# Patient Record
Sex: FEMALE | Race: OTHER | Employment: UNEMPLOYED | URBAN - METROPOLITAN AREA
[De-identification: names, ages, dates, MRNs, and addresses within clinical notes are randomized per-mention and may not be internally consistent; named-entity substitution may affect disease eponyms.]

---

## 2024-02-26 ENCOUNTER — OFFICE VISIT (OUTPATIENT)
Age: 29
End: 2024-02-26

## 2024-02-26 VITALS
SYSTOLIC BLOOD PRESSURE: 125 MMHG | DIASTOLIC BLOOD PRESSURE: 72 MMHG | TEMPERATURE: 97.6 F | RESPIRATION RATE: 18 BRPM | OXYGEN SATURATION: 99 % | HEART RATE: 58 BPM | HEIGHT: 60 IN | WEIGHT: 138 LBS | BODY MASS INDEX: 27.09 KG/M2

## 2024-02-26 DIAGNOSIS — N92.6 MISSED MENSES: Primary | ICD-10-CM

## 2024-02-26 DIAGNOSIS — O21.9 NAUSEA/VOMITING IN PREGNANCY: ICD-10-CM

## 2024-02-26 LAB
HCG, PREGNANCY, URINE, POC: POSITIVE
VALID INTERNAL CONTROL, POC: NORMAL

## 2024-02-26 PROCEDURE — PBSHW AMB POC URINE PREGNANCY TEST, VISUAL COLOR COMPARISON

## 2024-02-26 PROCEDURE — 99203 OFFICE O/P NEW LOW 30 MIN: CPT

## 2024-02-26 PROCEDURE — 81025 URINE PREGNANCY TEST: CPT

## 2024-02-26 RX ORDER — PNV NO.95/FERROUS FUM/FOLIC AC 28MG-0.8MG
1 TABLET ORAL DAILY
Qty: 30 TABLET | Refills: 5 | Status: SHIPPED | OUTPATIENT
Start: 2024-02-26

## 2024-02-26 RX ORDER — ONDANSETRON 4 MG/1
4 TABLET, FILM COATED ORAL DAILY PRN
Qty: 30 TABLET | Refills: 0 | Status: SHIPPED | OUTPATIENT
Start: 2024-02-26

## 2024-02-26 SDOH — ECONOMIC STABILITY: FOOD INSECURITY: WITHIN THE PAST 12 MONTHS, THE FOOD YOU BOUGHT JUST DIDN'T LAST AND YOU DIDN'T HAVE MONEY TO GET MORE.: SOMETIMES TRUE

## 2024-02-26 SDOH — ECONOMIC STABILITY: FOOD INSECURITY: WITHIN THE PAST 12 MONTHS, YOU WORRIED THAT YOUR FOOD WOULD RUN OUT BEFORE YOU GOT MONEY TO BUY MORE.: SOMETIMES TRUE

## 2024-02-26 SDOH — ECONOMIC STABILITY: INCOME INSECURITY: HOW HARD IS IT FOR YOU TO PAY FOR THE VERY BASICS LIKE FOOD, HOUSING, MEDICAL CARE, AND HEATING?: SOMEWHAT HARD

## 2024-02-26 SDOH — ECONOMIC STABILITY: HOUSING INSECURITY
IN THE LAST 12 MONTHS, WAS THERE A TIME WHEN YOU DID NOT HAVE A STEADY PLACE TO SLEEP OR SLEPT IN A SHELTER (INCLUDING NOW)?: NO

## 2024-02-26 ASSESSMENT — PATIENT HEALTH QUESTIONNAIRE - PHQ9
2. FEELING DOWN, DEPRESSED OR HOPELESS: 0
SUM OF ALL RESPONSES TO PHQ QUESTIONS 1-9: 0
SUM OF ALL RESPONSES TO PHQ9 QUESTIONS 1 & 2: 0
SUM OF ALL RESPONSES TO PHQ QUESTIONS 1-9: 0
1. LITTLE INTEREST OR PLEASURE IN DOING THINGS: 0
SUM OF ALL RESPONSES TO PHQ QUESTIONS 1-9: 0
SUM OF ALL RESPONSES TO PHQ QUESTIONS 1-9: 0

## 2024-02-26 ASSESSMENT — ENCOUNTER SYMPTOMS: NAUSEA: 1

## 2024-02-26 NOTE — PROGRESS NOTES
Subjective   Elvira Neil is a 28 y.o. female who presents for New Patient (Pt reports to [facility] care having Nausea for last 2 weeks & back pain. Pt reports for missed menses & UPT was positive. LMP 01/05/2024, 3rd pregnancy, 2 children .)      HPI  - Patient is here for evaluation of missed menses.   - Her menstrual periods are regular, and she reports that the first date of her LMP was 01/05/2024.   - She was not planning for this pregnancy.   - She is in a relationship with the father of the baby.   - She has had two other term pregnancies, denies any other medical complications with those pregnancies. The first baby was born at 40 weeks and the second at 38 weeks. Both were vaginal deliveries. Denies GHTN or gestational diabetes with either of those pregnancies. Both of the babies are healthy.     - Denies alcohol use. Does not smoke. Does not use recreational drugs.   - She denies history of STD or herpes.     Review of Systems   Review of Systems   Gastrointestinal:  Positive for nausea.     Medical History  No past medical history on file.    Medications  Current Outpatient Medications   Medication Sig    Prenatal Vit-Fe Fumarate-FA (PRENATAL VITAMINS) 28-0.8 MG TABS Take 1 tablet by mouth daily    ondansetron (ZOFRAN) 4 MG tablet Take 1 tablet by mouth daily as needed for Nausea or Vomiting     No current facility-administered medications for this visit.     Immunizations     There is no immunization history on file for this patient.    Allergies   No Known Allergies    Objective   Vital Signs  /72 (Site: Left Upper Arm, Position: Sitting, Cuff Size: Medium Adult)   Pulse 58   Temp 97.6 °F (36.4 °C) (Oral)   Resp 18   Ht 1.515 m (4' 11.65\")   Wt 62.6 kg (138 lb)   SpO2 99%   BMI 27.27 kg/m²     Physical Exam  Constitutional:       Appearance: Normal appearance.   Cardiovascular:      Rate and Rhythm: Normal rate and regular rhythm.      Pulses: Normal pulses.      Heart sounds: Normal heart

## 2024-02-26 NOTE — PROGRESS NOTES
# [id number]    Patient has been identified by name and .    Chief Complaint   Patient presents with    New Patient     Pt reports to [facility] care having Nausea for last 2 weeks & back pain. Pt reports for missed menses & UPT was positive. LMP 2024, 3rd pregnancy, 2 children .       Vitals:    24 1436   BP: 125/72   Site: Left Upper Arm   Position: Sitting   Cuff Size: Medium Adult   Pulse: 58   Resp: 18   Temp: 97.6 °F (36.4 °C)   TempSrc: Oral   SpO2: 99%   Weight: 62.6 kg (138 lb)   Height: 1.515 m (4' 11.65\")        \"Have you been to the ER, urgent care clinic since your last visit?  Hospitalized since your last visit?\"    NO    “Have you seen or consulted any other health care providers outside of [facility] since your last visit?”    NO

## 2024-02-29 ENCOUNTER — HOSPITAL ENCOUNTER (EMERGENCY)
Facility: HOSPITAL | Age: 29
Discharge: HOME | End: 2024-02-29
Attending: EMERGENCY MEDICINE

## 2024-02-29 ENCOUNTER — APPOINTMENT (OUTPATIENT)
Facility: HOSPITAL | Age: 29
End: 2024-02-29

## 2024-02-29 VITALS
HEIGHT: 65 IN | RESPIRATION RATE: 17 BRPM | WEIGHT: 138 LBS | HEART RATE: 71 BPM | OXYGEN SATURATION: 100 % | BODY MASS INDEX: 22.99 KG/M2 | SYSTOLIC BLOOD PRESSURE: 130 MMHG | TEMPERATURE: 98.4 F | DIASTOLIC BLOOD PRESSURE: 83 MMHG

## 2024-02-29 DIAGNOSIS — K21.9 GASTROESOPHAGEAL REFLUX DISEASE WITHOUT ESOPHAGITIS: ICD-10-CM

## 2024-02-29 DIAGNOSIS — O26.891 ABDOMINAL PAIN DURING PREGNANCY IN FIRST TRIMESTER: Primary | ICD-10-CM

## 2024-02-29 DIAGNOSIS — R10.9 ABDOMINAL PAIN DURING PREGNANCY IN FIRST TRIMESTER: Primary | ICD-10-CM

## 2024-02-29 LAB
ALBUMIN SERPL-MCNC: 3.8 G/DL (ref 3.5–5)
ALBUMIN/GLOB SERPL: 0.9 (ref 1.1–2.2)
ALP SERPL-CCNC: 72 U/L (ref 45–117)
ALT SERPL-CCNC: 28 U/L (ref 12–78)
ANION GAP SERPL CALC-SCNC: 4 MMOL/L (ref 5–15)
APPEARANCE UR: CLEAR
AST SERPL-CCNC: 15 U/L (ref 15–37)
BACTERIA URNS QL MICRO: NEGATIVE /HPF
BASOPHILS # BLD: 0.1 K/UL (ref 0–0.1)
BASOPHILS NFR BLD: 1 % (ref 0–1)
BILIRUB SERPL-MCNC: 0.2 MG/DL (ref 0.2–1)
BILIRUB UR QL: NEGATIVE
BUN SERPL-MCNC: 5 MG/DL (ref 6–20)
BUN/CREAT SERPL: 7 (ref 12–20)
CALCIUM SERPL-MCNC: 9.5 MG/DL (ref 8.5–10.1)
CHLORIDE SERPL-SCNC: 107 MMOL/L (ref 97–108)
CO2 SERPL-SCNC: 27 MMOL/L (ref 21–32)
COLOR UR: ABNORMAL
COMMENT:: NORMAL
CREAT SERPL-MCNC: 0.67 MG/DL (ref 0.55–1.02)
DIFFERENTIAL METHOD BLD: ABNORMAL
EOSINOPHIL # BLD: 0.1 K/UL (ref 0–0.4)
EOSINOPHIL NFR BLD: 1 % (ref 0–7)
EPITH CASTS URNS QL MICRO: ABNORMAL /LPF
ERYTHROCYTE [DISTWIDTH] IN BLOOD BY AUTOMATED COUNT: 13 % (ref 11.5–14.5)
GLOBULIN SER CALC-MCNC: 4.2 G/DL (ref 2–4)
GLUCOSE SERPL-MCNC: 93 MG/DL (ref 65–100)
GLUCOSE UR STRIP.AUTO-MCNC: NEGATIVE MG/DL
HCG SERPL-ACNC: ABNORMAL MIU/ML (ref 0–6)
HCT VFR BLD AUTO: 39.1 % (ref 35–47)
HGB BLD-MCNC: 13.3 G/DL (ref 11.5–16)
HGB UR QL STRIP: NEGATIVE
HYALINE CASTS URNS QL MICRO: ABNORMAL /LPF (ref 0–2)
IMM GRANULOCYTES # BLD AUTO: 0 K/UL (ref 0–0.04)
IMM GRANULOCYTES NFR BLD AUTO: 0 % (ref 0–0.5)
KETONES UR QL STRIP.AUTO: NEGATIVE MG/DL
LEUKOCYTE ESTERASE UR QL STRIP.AUTO: NEGATIVE
LYMPHOCYTES # BLD: 3.5 K/UL (ref 0.8–3.5)
LYMPHOCYTES NFR BLD: 27 % (ref 12–49)
MCH RBC QN AUTO: 30.9 PG (ref 26–34)
MCHC RBC AUTO-ENTMCNC: 34 G/DL (ref 30–36.5)
MCV RBC AUTO: 90.7 FL (ref 80–99)
MONOCYTES # BLD: 0.6 K/UL (ref 0–1)
MONOCYTES NFR BLD: 5 % (ref 5–13)
NEUTS SEG # BLD: 8.5 K/UL (ref 1.8–8)
NEUTS SEG NFR BLD: 66 % (ref 32–75)
NITRITE UR QL STRIP.AUTO: NEGATIVE
NRBC # BLD: 0 K/UL (ref 0–0.01)
NRBC BLD-RTO: 0 PER 100 WBC
PH UR STRIP: 7 (ref 5–8)
PLATELET # BLD AUTO: 390 K/UL (ref 150–400)
PMV BLD AUTO: 9.5 FL (ref 8.9–12.9)
POTASSIUM SERPL-SCNC: 3.5 MMOL/L (ref 3.5–5.1)
PROT SERPL-MCNC: 8 G/DL (ref 6.4–8.2)
PROT UR STRIP-MCNC: NEGATIVE MG/DL
RBC # BLD AUTO: 4.31 M/UL (ref 3.8–5.2)
RBC #/AREA URNS HPF: ABNORMAL /HPF (ref 0–5)
SODIUM SERPL-SCNC: 138 MMOL/L (ref 136–145)
SP GR UR REFRACTOMETRY: 1.01 (ref 1–1.03)
SPECIMEN HOLD: NORMAL
URINE CULTURE IF INDICATED: ABNORMAL
UROBILINOGEN UR QL STRIP.AUTO: 1 EU/DL (ref 0.2–1)
WBC # BLD AUTO: 12.9 K/UL (ref 3.6–11)
WBC URNS QL MICRO: ABNORMAL /HPF (ref 0–4)

## 2024-02-29 PROCEDURE — 76817 TRANSVAGINAL US OBSTETRIC: CPT

## 2024-02-29 PROCEDURE — 81001 URINALYSIS AUTO W/SCOPE: CPT

## 2024-02-29 PROCEDURE — 85025 COMPLETE CBC W/AUTO DIFF WBC: CPT

## 2024-02-29 PROCEDURE — 36415 COLL VENOUS BLD VENIPUNCTURE: CPT

## 2024-02-29 PROCEDURE — 84702 CHORIONIC GONADOTROPIN TEST: CPT

## 2024-02-29 PROCEDURE — 80053 COMPREHEN METABOLIC PANEL: CPT

## 2024-02-29 PROCEDURE — 6370000000 HC RX 637 (ALT 250 FOR IP): Performed by: NURSE PRACTITIONER

## 2024-02-29 PROCEDURE — 99284 EMERGENCY DEPT VISIT MOD MDM: CPT

## 2024-02-29 RX ORDER — POLYETHYLENE GLYCOL 3350 17 G/17G
17 POWDER, FOR SOLUTION ORAL DAILY
Status: DISCONTINUED | OUTPATIENT
Start: 2024-03-01 | End: 2024-02-29

## 2024-02-29 RX ORDER — POLYETHYLENE GLYCOL 3350 17 G/17G
17 POWDER, FOR SOLUTION ORAL DAILY
Qty: 510 G | Refills: 1 | Status: SHIPPED | OUTPATIENT
Start: 2024-02-29 | End: 2024-04-29

## 2024-02-29 RX ADMIN — ALUMINUM HYDROXIDE, MAGNESIUM HYDROXIDE, DIMETHICONE 40 ML: 400; 400; 40 SUSPENSION ORAL at 22:14

## 2024-02-29 ASSESSMENT — PAIN SCALES - GENERAL: PAINLEVEL_OUTOF10: 7

## 2024-02-29 ASSESSMENT — PAIN DESCRIPTION - LOCATION: LOCATION: ABDOMEN

## 2024-02-29 ASSESSMENT — PAIN - FUNCTIONAL ASSESSMENT: PAIN_FUNCTIONAL_ASSESSMENT: 0-10

## 2024-03-01 NOTE — ED TRIAGE NOTES
Pt presents to ER w/ c/o epigastric pain and bloating that began about 3 days ago. Has not tried any meds at home.  She states she is pregnant and is about 7 weeks pregnant. She states she goes to the [facility] for her OB care.

## 2024-03-01 NOTE — ED PROVIDER NOTES
Negative   Blood, Urine Negative   Urobilinogen, Urine 1.0   Nitrite, Urine Negative   Leukocyte Esterase, Urine Negative   Urine Culture if Indicated CULTURE NOT INDICATED BY UA RESULT   WBC, UA 0-4   RBC, UA 0-5   Epithelial Cells, UA MANY(!)   Bacteria, UA Negative   Hyaline Casts, UA 0-2  Negative for cystitis. [AF]   2145 Comprehensive Metabolic Panel(!):    Sodium 138   Potassium 3.5   Chloride 107   CO2 27   Anion Gap 4(!)   Glucose, Random 93   BUN,BUNPL 5(!)   Creatinine 0.67   Bun/Cre Ratio 7(!)   Est, Glom Filt Rate >60   CALCIUM, SERUM, 715687 9.5   BILIRUBIN TOTAL 0.2   ALT 28   AST 15   Alk Phos 72   Total Protein 8.0   Albumin 3.8   Globulin 4.2(!)   ALBUMIN/GLOBULIN RATIO 0.9(!)  WNL [AF]   2145 CBC with Auto Differential(!):    WBC 12.9(!)   RBC 4.31   Hemoglobin Quant 13.3   Hematocrit 39.1   MCV 90.7   MCH 30.9   MCHC 34.0   RDW 13.0   Platelet Count 390   MPV 9.5   Nucleated Red Blood Cells 0.0   Nucleated Red Blood Cells 0.00   Neutrophils % 66   Lymphocyte % 27   Monocytes % 5   Eosinophils % 1   Basophils % 1   Immature Granulocytes 0   Neutrophils Absolute 8.5(!)   Lymphocytes Absolute 3.5   Monocytes Absolute 0.6   Eosinophils Absolute 0.1   Basophils Absolute 0.1   Absolute Immature Granulocyte 0.0   Differential Type AUTOMATED  Wbc 12.9 [AF]   2256 HCG, Quantitative, Pregnancy(!):    hCG Quant 127,645(!)  680473 [AF]   2256 US OB TRANSVAGINAL  IMPRESSION:  Single viable intrauterine pregnancy with estimated gestational age  of 7 weeks 5 days. Normal ovaries.      [AF]      ED Course User Index  [AF] She Roger, APRN - NP   Patient re-evaluated with the  [place]: Ant [id number], all labs and imaging reassuring, patient patient states she is feeling better at this time after the GI cocktail.  Patient states that since she has started taking the Zofran that her bowel movements but that she did have a normal bowel movement.  Lab work unremarkable for dehydration, mild

## 2024-03-01 NOTE — DISCHARGE INSTRUCTIONS
TUMS, Mylanta or Miralax or Tylenol.           Thank you for allowing us to provide you with medical care today.  We realize that you have many choices for your emergency care needs.  We thank you for choosing [facility].  Please choose us in the future for any continued health care needs.     We hope we addressed all of your medical concerns. We strive to provide excellent quality care in the Emergency Department.  Anything less than excellent does not meet our expectations.     The exam and treatment you received in the Emergency Department were for an emergent problem and are not intended as complete care. It is important that you follow up with a doctor, nurse practitioner, or physician’s assistant for ongoing care. If your symptoms worsen or you do not improve as expected and you are unable to reach your usual health care provider, you should return to the Emergency Department. We are available 24 hours a day.     Take this sheet with you when you go to your follow-up visit.     If you have any problem arranging the follow-up visit, contact the Emergency Department immediately.     Make an appointment your family doctor for follow up of this visit. Return to the ER if you are unable to be seen in a timely manner.

## 2024-03-18 ENCOUNTER — HOSPITAL ENCOUNTER (OUTPATIENT)
Facility: HOSPITAL | Age: 29
Setting detail: SPECIMEN
Discharge: HOME | End: 2024-03-21
Payer: MEDICAID

## 2024-03-18 ENCOUNTER — OFFICE VISIT (OUTPATIENT)
Age: 29
End: 2024-03-18

## 2024-03-18 ENCOUNTER — ROUTINE PRENATAL (OUTPATIENT)
Age: 29
End: 2024-03-18

## 2024-03-18 VITALS
SYSTOLIC BLOOD PRESSURE: 112 MMHG | HEIGHT: 65 IN | DIASTOLIC BLOOD PRESSURE: 70 MMHG | BODY MASS INDEX: 22.66 KG/M2 | WEIGHT: 136 LBS | RESPIRATION RATE: 13 BRPM | HEART RATE: 84 BPM | TEMPERATURE: 98.8 F | OXYGEN SATURATION: 98 %

## 2024-03-18 DIAGNOSIS — Z59.9 FINANCIAL DIFFICULTY: ICD-10-CM

## 2024-03-18 DIAGNOSIS — Z13.9 ENCOUNTER FOR SCREENING INVOLVING SOCIAL DETERMINANTS OF HEALTH (SDOH): Primary | ICD-10-CM

## 2024-03-18 DIAGNOSIS — Z34.91 INITIAL OBSTETRIC VISIT IN FIRST TRIMESTER: Primary | ICD-10-CM

## 2024-03-18 DIAGNOSIS — Z59.41 FOOD INSECURITY: ICD-10-CM

## 2024-03-18 LAB
ABO, EXTERNAL RESULT: NORMAL
HEP B, EXTERNAL RESULT: NEGATIVE
HEPATITIS C ANTIBODY, EXTERNAL RESULT: NORMAL
HIV, EXTERNAL RESULT: NORMAL
RH FACTOR, EXTERNAL RESULT: POSITIVE
RUBELLA TITER, EXTERNAL RESULT: NORMAL

## 2024-03-18 PROCEDURE — 88175 CYTOPATH C/V AUTO FLUID REDO: CPT

## 2024-03-18 PROCEDURE — 87491 CHLMYD TRACH DNA AMP PROBE: CPT

## 2024-03-18 PROCEDURE — 87591 N.GONORRHOEAE DNA AMP PROB: CPT

## 2024-03-18 PROCEDURE — 87661 TRICHOMONAS VAGINALIS AMPLIF: CPT

## 2024-03-18 SDOH — ECONOMIC STABILITY - FOOD INSECURITY: FOOD INSECURITY: Z59.41

## 2024-03-18 SDOH — ECONOMIC STABILITY - INCOME SECURITY: PROBLEM RELATED TO HOUSING AND ECONOMIC CIRCUMSTANCES, UNSPECIFIED: Z59.9

## 2024-03-18 NOTE — PROGRESS NOTES
SW Navigator met with patient to complete initial social needs assessment. Patient verified and confirmed demographics on file.      Review of SDOH:   Food insecurity, Financial difficulty, Transportation need    Medical insurance? 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 Has appointment on Wednesday 3/20/24  - Support system , FOB  - Any other worries or concerns, no    Personal Safety:  Domestic violence - no hx DV  General safety - Patient feels safe in relations, in home, and in neighborhood    Patient is 29 yo pregnant female from [facility] [place]. Patient lives with partner and their 2 children ages 10 and 4. Patient is unemployed; however, partner does work. Work hours are limited at times. Reports food insecurity and financial difficulty. Patient asked about transportation services. SW advised patient that transportation is provided via Medicaid. Currently not on a managed care plan and SW unable to add [facility] during visit. Advised to check back in 30 days to select [employer]CO plan, if not already in system. Patient understands she will be able to contact [employer] for transportation needs. Patient has a scheduled WIC appointment, requested pregnancy verification form during visit. SW provided form.      Patient states issue with Medicaid as name is incorrect. LISSET advised that second last name is missing from Medicaid card. LISSET assisted patient by faxing correction request to [facility] DSS office. Patient advised it may take 30+ days for change to be

## 2024-03-18 NOTE — PATIENT INSTRUCTIONS
Recursos de transporte del área de [place]*  (Llame al 211 si necesita más recursos)    [facility] Transit System  Lo que ofrecen: jami transporte público al área [facility] y partes de los condados de [place] y [place].  Sitio web: http://www.[employer]/.  Número de teléfono: [phone]    Servicios especializados [facility] (CARE & CARE Plus).  Lo que ofrecen: proporciona acceso al transporte público para personas con discapacidades que pueden no ser razonablemente capaces de utilizar el servicio de autobús de francine fija de [facility]. Proporciona servicios de origen a destino de acuerdo con las pautas de la [place] para Estadounidenses con Discapacidades (Americans with Disabilities Act, ADA).  Sitio web: http://[employer]/services/specialized-transportation/care.  Número de teléfono: [phone]    [employer] Ride Connection  Lo que ofrecen: Ride Connection ofrece 2 viajes de paul y vuelta por mes a citas médicas que no gary de emergencia (deben calificar).  Sitio web: https://seniorconnections-va.org/services/support-to-stay-home/ride-connections/.  Número de teléfono: [phone]  Requisitos de elegibilidad: personas con discapacidades (de 18 años o más) o adultos mayores (de 60 años o más) que viven en la [facility] o en los condados de [place], [place], [place], [place], [place], [place] y [place].    Let’s Go Services  Lo que ofrecen: servicios de transporte basados en donaciones para veteranos, familias necesitadas, personas de edad avanzada y personas con discapacidades.  Sitio web: https://www.letsgoservices.org/   Número de teléfono: [phone]  Información adicional: ofrece transporte a citas, tiendas de comestibles, aeropuertos, etc. en las áreas de [place], [place], [place], [place] y [place].    [facility] Transit: servicio de transporte comunitario que presta servicios a [place], [facility] and [place], [facility],

## 2024-03-18 NOTE — PROGRESS NOTES
I reviewed with the resident the medical history and the resident's findings on the physical examination.  I discussed with the resident the patient's diagnosis and concur with the plan.    27yo  @ 10w3d by LMP=10 wk dating scan   IUP: IOB labs today, dating completed, will offer NIPT today

## 2024-03-18 NOTE — PROGRESS NOTES
[facility]  [address] [place] [address]   Office [phone], Fax [phone]  Subjective:   Elvira Neil is a 28 y.o.  who is being seen today for her first obstetrical visit.     Patient reports feeling well. No concerns at this time. Pregnancy complicated by prior ectopic pregnancy, prior SAB.    OB History: See Chart    This was not a planned pregnancy, patient is excited about it. Reports good support from FOB.  LMP: 24  GA: 10w3d  Estimated Date of Delivery: 10/11/24    Relevant past medical history:(relevant to this pregnancy):   Denies HTN, DM or asthma. Denies thyroid problems.      Pap smear history:  Last pap smear: 2 years ago, was normal     Substance history:   She does not report current tobacco use.                           She does not report current alcohol use.  She does not report current drug use.     Exposure history:   There are not indoor cat(s) in the home.  The patient was instructed not to change cat litter boxes during pregnancy.  Patient does not report issues with domestic violence.    Allergies- reviewed:   No Known Allergies    Medications- reviewed:   Current Outpatient Medications   Medication Sig    Prenatal Vit-Fe Fumarate-FA (PRENATAL VITAMINS) 28-0.8 MG TABS Take 1 tablet by mouth daily    ondansetron (ZOFRAN) 4 MG tablet Take 1 tablet by mouth daily as needed for Nausea or Vomiting    polyethylene glycol (GLYCOLAX) 17 GM/SCOOP powder Take 17 g by mouth daily (Patient not taking: Reported on 3/18/2024)     No current facility-administered medications for this visit.       Past Medical History- reviewed:  Past Medical History:   Diagnosis Date    Ectopic pregnancy        Past Surgical History- reviewed:   History reviewed. No pertinent surgical history.    Social History- reviewed:  Social History     Socioeconomic History    Marital status: Single     Spouse name: Not on file    Number of children: Not on file

## 2024-03-18 NOTE — PROGRESS NOTES
Session Code 28979  / : # [id number]     No chief complaint on file.      Patient identified with 2 patient identifiers (name and D.O.B)    Patient is a  at Unknown    Leakage of Fluid: NO  Vaginal Bleeding: NO  Prenatal vitamins: YES  Having Contractions: NO  Pain: NO    There were no vitals taken for this visit.      There is no immunization history on file for this patient.    1. Have you been to the ER, urgent care clinic since your last visit?  Hospitalized since your last visit? No    2. Have you seen or consulted any other health care providers outside of the [facility] since your last visit?  Include any pap smears or colon screening. No

## 2024-03-19 LAB
ABO + RH BLD: NORMAL
BLOOD BANK CMNT PATIENT-IMP: NORMAL
BLOOD GROUP ANTIBODIES SERPL: NORMAL
ERYTHROCYTE [DISTWIDTH] IN BLOOD BY AUTOMATED COUNT: 13 % (ref 11.5–14.5)
HBV SURFACE AB SER QL: NONREACTIVE
HBV SURFACE AB SER-ACNC: <3.1 MIU/ML
HBV SURFACE AG SER QL: <0.1 INDEX
HBV SURFACE AG SER QL: NEGATIVE
HCT VFR BLD AUTO: 38.3 % (ref 35–47)
HCV AB SER IA-ACNC: <0.02 INDEX
HCV AB SERPL QL IA: NONREACTIVE
HGB BLD-MCNC: 13 G/DL (ref 11.5–16)
HIV 1+2 AB+HIV1 P24 AG SERPL QL IA: NONREACTIVE
HIV 1/2 RESULT COMMENT: NORMAL
MCH RBC QN AUTO: 30.7 PG (ref 26–34)
MCHC RBC AUTO-ENTMCNC: 33.9 G/DL (ref 30–36.5)
MCV RBC AUTO: 90.3 FL (ref 80–99)
NRBC # BLD: 0 K/UL (ref 0–0.01)
NRBC BLD-RTO: 0 PER 100 WBC
PLATELET # BLD AUTO: 429 K/UL (ref 150–400)
PMV BLD AUTO: 9.2 FL (ref 8.9–12.9)
RBC # BLD AUTO: 4.24 M/UL (ref 3.8–5.2)
RPR SER QL: NONREACTIVE
RUBV IGG SERPL IA-ACNC: NORMAL IU/ML
SPECIMEN EXP DATE BLD: NORMAL
WBC # BLD AUTO: 11.7 K/UL (ref 3.6–11)

## 2024-03-20 LAB
BACTERIA SPEC CULT: NORMAL
C TRACH RRNA SPEC QL NAA+PROBE: NEGATIVE
N GONORRHOEA RRNA SPEC QL NAA+PROBE: NEGATIVE
SERVICE CMNT-IMP: NORMAL
SPECIMEN SOURCE: NORMAL
T VAGINALIS RRNA SPEC QL NAA+PROBE: NEGATIVE
VZV IGG SER IA-ACNC: 190 INDEX

## 2024-03-21 LAB — HBV CORE AB SERPL QL IA: NEGATIVE

## 2024-03-22 LAB
HGB A MFR BLD: 97.7 % (ref 96.4–98.8)
HGB A2 MFR BLD COLUMN CHROM: 2.3 % (ref 1.8–3.2)
HGB F MFR BLD: 0 % (ref 0–2)
HGB FRACT BLD-IMP: NORMAL
HGB S MFR BLD: 0 %

## 2024-04-15 ENCOUNTER — ROUTINE PRENATAL (OUTPATIENT)
Age: 29
End: 2024-04-15
Payer: COMMERCIAL

## 2024-04-15 VITALS
OXYGEN SATURATION: 99 % | HEART RATE: 90 BPM | HEIGHT: 65 IN | TEMPERATURE: 98.5 F | SYSTOLIC BLOOD PRESSURE: 114 MMHG | DIASTOLIC BLOOD PRESSURE: 73 MMHG | WEIGHT: 141 LBS | BODY MASS INDEX: 23.49 KG/M2 | RESPIRATION RATE: 18 BRPM

## 2024-04-15 DIAGNOSIS — Z78.9 NONIMMUNE TO HEPATITIS B VIRUS: ICD-10-CM

## 2024-04-15 DIAGNOSIS — Z3A.14 14 WEEKS GESTATION OF PREGNANCY: Primary | ICD-10-CM

## 2024-04-15 PROCEDURE — 90746 HEPB VACCINE 3 DOSE ADULT IM: CPT | Performed by: STUDENT IN AN ORGANIZED HEALTH CARE EDUCATION/TRAINING PROGRAM

## 2024-04-15 PROCEDURE — 0502F SUBSEQUENT PRENATAL CARE: CPT | Performed by: STUDENT IN AN ORGANIZED HEALTH CARE EDUCATION/TRAINING PROGRAM

## 2024-04-15 ASSESSMENT — PATIENT HEALTH QUESTIONNAIRE - PHQ9
SUM OF ALL RESPONSES TO PHQ QUESTIONS 1-9: 0
SUM OF ALL RESPONSES TO PHQ QUESTIONS 1-9: 0
1. LITTLE INTEREST OR PLEASURE IN DOING THINGS: NOT AT ALL
SUM OF ALL RESPONSES TO PHQ QUESTIONS 1-9: 0
SUM OF ALL RESPONSES TO PHQ QUESTIONS 1-9: 0
2. FEELING DOWN, DEPRESSED OR HOPELESS: NOT AT ALL
SUM OF ALL RESPONSES TO PHQ9 QUESTIONS 1 & 2: 0

## 2024-04-15 NOTE — PROGRESS NOTES
: [id number]    Chief Complaint   Patient presents with    Routine Prenatal Visit        Patient identified by name and . Patient is a  at 14w3d.    Taking prenatal vitamins: Yes  Leakage of fluid: No  Vaginal bleeding: No  Feeling baby move if over 20 weeks: Not yet still 14 weeks  Contractions: No  Pain: No    Vitals:    04/15/24 1504   BP: 114/73   Site: Right Upper Arm   Position: Sitting   Cuff Size: Medium Adult   Pulse: 90   Resp: 18   Temp: 98.5 °F (36.9 °C)   TempSrc: Oral   SpO2: 99%   Weight: 64 kg (141 lb)   Height: 1.64 m (5' 4.57\")          There is no immunization history on file for this patient.    \"Have you been to the ER, urgent care clinic since your last visit?  Hospitalized since your last visit?\"    NO    “Have you seen or consulted any other health care providers outside of [facility] since your last visit?”    NO      Click Here for Release of Records Request            
I reviewed with the resident the medical history and the resident's findings on the physical examination.  I discussed with the resident the patient's diagnosis and concur with the plan.    29yo  @ 14w3d by LMP=10 wk dating scan   IUP: RH pos, dating completed, will offer NIPT today, referral for anatomy sent-confirm scheduled at next visit   Hep B NI: will start series today   
Standard)    [facility] - Minnie Ramírez MD, Maternal Fetal Medicine, [place]      2. Nonimmune to hepatitis B virus  Hep B, ENGERIX-B, (age 20 yrs+), IM, 1mL, 3-dose            Plan   28 y.o.  14w3d LISETTE 10/11/2024, by Last Menstrual Period here for return OB visit     1.SIUP at 14w3d  -PNL:O+, Ab neg. Hgb 13.0. Hgb frac wnl. GC/CT neg. HCV/HIV/HBV/RPR negative. HBV nonimmune. Rubella/VZV immune. Ucx NG. Pap NILM.   -Genetic testing: Done today  -Ultrasound: Dating US on 3/18 c/w LISETTE by LMP. MFM anatomy scan ordered (let patient know the date at next visit)      Pregnancy concerns:    Hep B non-immune:  3-dose series today at 0, 1, and 6 mos; 1st dose given today      ---------------------------------------  Continuity Provider: Daniel jordan. in labor: TBD  PP family planning: TBD  Feeding: TBD  Circ: TBD      Orders Placed This Encounter   Procedures    Hep B, ENGERIX-B, (age 20 yrs+), IM, 1mL, 3-dose    Panorama Prenatal Test     Standing Status:   Future     Number of Occurrences:   1     Standing Expiration Date:   4/10/2025     Order Specific Question:   What type of billing?     Answer:   Bill Insurance     Order Specific Question:   Expected due date (MM/DD/YYYY):     Answer:   10/11/2024     Order Specific Question:   Maternal Weight (lbs):     Answer:   141     Order Specific Question:   Which Microdeletion Panel is ordered?     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 (viable, no vanished twin)     Answer:   Not Twin Pregnancy, Pablo     Order Specific Question:   I want fetal sex included in the report:     Answer:   Yes     Order Specific Question:   Is this a surrogate or egg donor pregnancy?     Answer:   No     Order Specific Question:   Select an order diagnosis:     Answer:   Encounter for supervision of other normal pregnancy, second trimester [[id number]]     Order Specific Question:   Amaya to follow up with patient for sample collection

## 2024-04-24 LAB
Lab: ABNORMAL
Lab: POSITIVE
NTRA ALPHA-THALASSEMIA: NEGATIVE
NTRA BETA-HEMOGLOBINOPATHIES: NEGATIVE
NTRA CANAVAN DISEASE: NEGATIVE
NTRA CYSTIC FIBROSIS: NEGATIVE
NTRA DUCHENNE/BECKER MUSCULAR DYSTROPHY: NEGATIVE
NTRA FAMILIAL DYSAUTONOMIA: NEGATIVE
NTRA FRAGILE X SYNDROME: NEGATIVE
NTRA GALACTOSEMIA: NEGATIVE
NTRA GAUCHER DISEASE: NEGATIVE
NTRA MEDIUM CHAIN ACYL-COA DEHYDROGENASE DEFICIENCY: NEGATIVE
NTRA POLYCYSTIC KIDNEY DISEASE, AUTOSOMAL RECESSIVE: NEGATIVE
NTRA SMITH-LEMLI-OPITZ SYNDROME: POSITIVE
NTRA SPINAL MUSCULAR ATROPHY: NEGATIVE
NTRA TAY-SACHS DISEASE: NEGATIVE

## 2024-05-13 ENCOUNTER — ROUTINE PRENATAL (OUTPATIENT)
Age: 29
End: 2024-05-13

## 2024-05-13 VITALS
OXYGEN SATURATION: 98 % | HEIGHT: 65 IN | TEMPERATURE: 99 F | BODY MASS INDEX: 24.06 KG/M2 | HEART RATE: 69 BPM | SYSTOLIC BLOOD PRESSURE: 109 MMHG | RESPIRATION RATE: 13 BRPM | WEIGHT: 144.4 LBS | DIASTOLIC BLOOD PRESSURE: 70 MMHG

## 2024-05-13 DIAGNOSIS — Z78.9 NONIMMUNE TO HEPATITIS B VIRUS: ICD-10-CM

## 2024-05-13 DIAGNOSIS — Z3A.18 18 WEEKS GESTATION OF PREGNANCY: Primary | ICD-10-CM

## 2024-05-13 DIAGNOSIS — O28.5 ABNORMAL GENETIC TEST DURING PREGNANCY: ICD-10-CM

## 2024-05-13 PROCEDURE — 0502F SUBSEQUENT PRENATAL CARE: CPT | Performed by: STUDENT IN AN ORGANIZED HEALTH CARE EDUCATION/TRAINING PROGRAM

## 2024-05-13 NOTE — PROGRESS NOTES
I reviewed with the resident the medical history and the resident's findings on the physical examination.  I discussed with the resident the patient's diagnosis and concur with the plan.    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   
Session Code 48654  / : Tho #[id number]    Chief Complaint   Patient presents with    Routine Prenatal Visit     They        Patient identified with 2 patient identifiers (name and D.O.B)    Patient is a  at 18w3d    Leakage of Fluid: NO  Vaginal Bleeding: NO  Prenatal vitamins: YES  Having Contractions: NO  Pain: NO    LMP 2024     Immunization History   Administered Date(s) Administered    Hep B, ENGERIX-B, (age 20y+), IM, 1mL 04/15/2024       1. Have you been to the ER, urgent care clinic since your last visit?  Hospitalized since your last visit?NO    2. Have you seen or consulted any other health care providers outside of the [facility] since your last visit?  Include any pap smears or colon screening. NO    
Daniel,   Family Medicine Resident

## 2024-05-24 ENCOUNTER — ROUTINE PRENATAL (OUTPATIENT)
Age: 29
End: 2024-05-24

## 2024-05-24 ENCOUNTER — ROUTINE PRENATAL (OUTPATIENT)
Age: 29
End: 2024-05-24
Payer: COMMERCIAL

## 2024-05-24 VITALS — DIASTOLIC BLOOD PRESSURE: 74 MMHG | SYSTOLIC BLOOD PRESSURE: 115 MMHG | HEART RATE: 85 BPM

## 2024-05-24 DIAGNOSIS — Z14.8 CARRIER OF GENETIC DISORDER: ICD-10-CM

## 2024-05-24 DIAGNOSIS — O09.299 PREGNANCY WITH POOR OBSTETRIC HISTORY: Primary | ICD-10-CM

## 2024-05-24 DIAGNOSIS — Z14.8 CARRIER OF GENETIC DISORDER: Primary | ICD-10-CM

## 2024-05-24 DIAGNOSIS — Z3A.20 20 WEEKS GESTATION OF PREGNANCY: ICD-10-CM

## 2024-05-24 PROCEDURE — 76811 OB US DETAILED SNGL FETUS: CPT | Performed by: OBSTETRICS & GYNECOLOGY

## 2024-05-24 PROCEDURE — 99024 POSTOP FOLLOW-UP VISIT: CPT | Performed by: OBSTETRICS & GYNECOLOGY

## 2024-05-24 NOTE — PROGRESS NOTES
defects. Lifespan in children with SLOS is often shortened and death occurs before age 2 in up to a third of affected children. The condition varies from person to person and rare individuals with SLOS have fewer symptoms and mild to no intellectual disability. Currently there is no cure for this condition and specialized medical care is needed throughout life.      SLOS is caused by a gene change, or mutation, in both copies of the DHCR7 gene pair. These mutations cause the genes to not work properly or not work at all. SLOS is inherited in an autosomal recessive manner. If both parents are SLO carriers, possible pregnancy outcomes include: 1 in 4 (25%) chance of having a child that is neither affected nor a carrier, 1 in 2 (50%) chance to have a child that is a carrier, and 1 in 4 (25%) chance to have a child with SLOS.     The benefits, risks and limitations of SLO carrier screening for the FOB were reviewed in depth. At this time the couple ELECTED to pursue this screening today via a saliva sample for the FOB. They were provided with an order and a collection kit to take home. If this has not been completed by the FOB by their upcoming 6/11/24 OB appointment, his blood can be collected at that time.      The availability, benefits, risks and limitations of ultrasound and amniocentesis in the diagnosis of SLOS were reviewed in depth.     Amniocentesis for SLOS can be performed when both parental mutations are known. Amniocentesis is typically performed between 16 and 20 weeks gestation, although it can often be performed earlier or later with reasonable safety, depending on the circumstances of the case. The risk for complication from amniocentesis is 1 in 400.  The accuracy of amniocentesis is greater than 99% for chromosomal abnormalities such as aneuploidy, and approximately 98% for open neural tube defects when used in combination with detailed anatomic ultrasound.  The accuracy of other genetic testing

## 2024-05-25 NOTE — PROCEDURES
of AF: normal    Fetal Anatomy  ===========    Cranium: normal  Lateral ventricles: normal  Choroid plexus: normal  Midline falx: normal  Cavum septi pellucidi: normal  Cerebellum: normal  Cisterna magna: normal  Head / Neck  Vermis: normal  Neck: normal  Lips: normal  Profile: normal  Nose: normal  Face  Coronal face: normal  Nasal bone: present  Palate: normal  Maxilla: normal  Mandible: normal  Orbits: normal  4-chamber view: normal  RVOT view: normal  LVOT view: normal  3-vessel view: normal  3-vessel-trachea view: normal  Heart / Thorax  Situs: situs solitus (normal)  Aortic arch view: normal  Bicaval view: normal  Ductal arch view: normal  Interventricular septum: normal  Cardiac position: levocardia (normal)  Cardiac axis: normal  Cardiac size: normal (approx. 1/3 of thoracic area)  Cardiac rhythm: regular (normal)  Rt lung: normal  Lt lung: normal  Diaphragm: normal  Cord insertion: normal  Stomach: normal  Kidneys: normal  Bladder: normal  Genitals: normal  Abdomen  Rt renal artery: normal  Lt renal artery: normal  Cervical spine: normal  Thoracic spine: normal  Lumbar spine: normal  Sacral spine: normal  Rt arm: normal  Lt arm: normal  Rt hand: normal  Rt fingers: normal  Lt hand: normal  Lt fingers: normal  Rt leg: normal  Lt leg: normal  Rt foot: normal  Rt toes: normal  Lt foot: normal  Lt toes: normal  Wants to know fetal sex: yes    Maternal Structures  ===============    Uterus / Cervix  Cervix: Normal  Approach: Transabdominal  Cervical length 5.56 cm  Funneling: Funneling absent  Ovaries / Tubes / Adnexa  Rt ovary: Normal  Rt ovary D1 2.2 cm  Rt ovary D2 1.8 cm  Rt ovary D3 0.9 cm  Rt ovary Vol 1.8 cm³  Lt ovary: Normal  Lt ovary D1 4.1 cm  Lt ovary D2 2.9 cm  Lt ovary D3 1.1 cm  Lt ovary Vol 6.9 cm³    Findings  =======    Intrauterine Pablo pregnancy at 20w 0d by clinical dates.  EFW is 319 g at 40% and AC at 29%.  Fetal and maternal anatomy visualized as stated above. Amniotic fluid: normal.

## 2024-06-11 ENCOUNTER — ROUTINE PRENATAL (OUTPATIENT)
Age: 29
End: 2024-06-11
Payer: COMMERCIAL

## 2024-06-11 VITALS
DIASTOLIC BLOOD PRESSURE: 67 MMHG | TEMPERATURE: 98.1 F | HEART RATE: 80 BPM | OXYGEN SATURATION: 98 % | HEIGHT: 65 IN | RESPIRATION RATE: 14 BRPM | BODY MASS INDEX: 24.22 KG/M2 | SYSTOLIC BLOOD PRESSURE: 101 MMHG | WEIGHT: 145.4 LBS

## 2024-06-11 DIAGNOSIS — Z3A.22 22 WEEKS GESTATION OF PREGNANCY: Primary | ICD-10-CM

## 2024-06-11 PROCEDURE — 90746 HEPB VACCINE 3 DOSE ADULT IM: CPT | Performed by: STUDENT IN AN ORGANIZED HEALTH CARE EDUCATION/TRAINING PROGRAM

## 2024-06-11 NOTE — PROGRESS NOTES
I reviewed with the resident the medical history and the resident's findings on the physical examination.  I discussed with the resident the patient's diagnosis and concur with the plan.    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  
Session Code 19089  / : Ryan #[id number]    Chief Complaint   Patient presents with    Routine Prenatal Visit       Patient identified with 2 patient identifiers (name and D.O.B)    Patient is a  at 22w4d    Leakage of Fluid: NO  Vaginal Bleeding: NO  Fetal Movement if > 20 weeks: YES  Prenatal vitamins: YES  Having Contractions: NO  Pain: NO    LMP 2024 (Exact Date)     Immunization History   Administered Date(s) Administered    Hep B, ENGERIX-B, (age 20y+), IM, 1mL 04/15/2024       1. Have you been to the ER, urgent care clinic since your last visit?  Hospitalized since your last visit?NO    2. Have you seen or consulted any other health care providers outside of the [facility] since your last visit?  Include any pap smears or colon screening. NO       
10/11/24  here for return OB visit.     SIUP at 22w4d  - PNL:  O+, ab screen neg. Hgb 13.0. Hgb frac wnl. GC/CT neg. HCV/HIV/HBV/RPR negative. HBV non-immune. Rubella/VZV immune. Ucx NG. Pap NILM.    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 Declined invasive testing. FOB tested at [facility] appt, results pending.   - Obtained AFP per [facility] recommendation today    Hep B non-immune: S/p immunization on 4/15.   - Second dose given today   - Due for final immunization in October    ---------------------------------------  Continuity Provider: Daniel jordan. in labor: TBD  Postpartum BC: TBD  Feeding: TBD  Circ:  N/A  ---------------------------------------    Orders Placed This Encounter   Procedures    Hep B, ENGERIX-B, (age 20 yrs+), IM, 1mL, 3-dose    Alpha Fetoprotein, Maternal     Standing Status:   Future     Number of Occurrences:   1     Standing Expiration Date:   6/11/2025     Order Specific Question:   Is this patient insulin dependent?          (Required)     Answer:   No     Order Specific Question:   Patient Weight  (lbs)          (Required  - Whole Number)     Answer:   145     Order Specific Question:   Gestational Age (weeks)?          (Required - Whole Number)     Answer:   22     Order Specific Question:   Gestational Age Calculation?    (Required)     Answer:   1/5/2024     Order Specific Question:   Number of Fetuses?          (Required)     Answer:   1     Order Specific Question:   Donor Egg ?          (Required)     Answer:   N     Order Specific Question:   Previous Pregnancy Down Syndrome?     Answer:   N     Order Specific Question:   Date of LMP:     Answer:   1/5/2024    [facility] Zak Ramírez,

## 2024-06-14 LAB
AFP INTERP SERPL-IMP: NORMAL
AFP MOM SERPL: 1.65
AFP SERPL-MCNC: 139.7 NG/ML
AGE AT DELIVERY: 28.9 YR
AGE OF EGG DONOR: NORMAL
AGE OF EGG DONOR: NORMAL
COMMENT: NORMAL
DONOR EGG?: NO
DONOR EGG?: NORMAL
FAMILY HISTORY NTD: NORMAL
FHX NTD (Y OR N): NORMAL
GA METHOD: NORMAL
GA: 22.6 WEEKS
IDDM PATIENT QL: NO
INSULIN DEP. DIABETIC: NORMAL
Lab: 145
Lab: NORMAL
Lab: NORMAL
MAT SCN FOR FETAL ABNORMALITIES SERPL: NORMAL
MULTIPLE PREGNANCY: NO
NEURAL TUBE DEFECT RISK FETUS: 1848
NUMBER OF FETUSES: NO
OTHER INDICATIONS: NO
OTHER INDICATIONS: NORMAL
PREVIOUSLY ELEVATED AFP (Y OR N): 22
PREVIOUSLY ELEVATED AFP (Y OR N): NO
PRIOR 1ST TRIM TESTING ?: NO
PRIOR 2ND TRIM TESTING ?: NO
PRIOR DS/NTD SCREEN CURRENT PREGNANCY?: NO
PRIOR DS/NTD SCREEN CURRENT PREGNANCY?: NORMAL
PRIOR FIRST TRIMESTER TESTING (Y OR N ): NORMAL
PRIOR PREGNANCY WITH DOWN SYNDROME (Y OR N): 1
PRIOR PREGNANCY WITH DOWN SYNDROME (Y OR N): NO
TYPE OF EGG DONOR: NORMAL
TYPE OF EGG DONOR: NORMAL

## 2024-06-24 ENCOUNTER — TELEPHONE (OUTPATIENT)
Age: 29
End: 2024-06-24

## 2024-06-24 NOTE — TELEPHONE ENCOUNTER
I called and talked to patient she was informed that she had an appointment with [facility] and her labs were normal. She had no questions at this time.

## 2024-06-24 NOTE — TELEPHONE ENCOUNTER
----- Message from Adrienne Sanchez DO sent at 6/15/2024  6:09 PM EDT -----  Can you let her know that we got her appt with [facility] on July 1st at 11:15AM and that this lab we got at her visit was normal? It's the genetic screening that her specialist wanted. Thanks!  ----- Message -----  From: Steven Grant Incoming Allen [facility]  Sent: 6/14/2024  11:37 PM EDT  To: Adrienne Sanchez DO

## 2024-07-10 ENCOUNTER — ROUTINE PRENATAL (OUTPATIENT)
Age: 29
End: 2024-07-10

## 2024-07-10 VITALS
SYSTOLIC BLOOD PRESSURE: 117 MMHG | OXYGEN SATURATION: 98 % | WEIGHT: 150 LBS | DIASTOLIC BLOOD PRESSURE: 73 MMHG | BODY MASS INDEX: 25.3 KG/M2 | RESPIRATION RATE: 16 BRPM

## 2024-07-10 DIAGNOSIS — Z34.90 ENCOUNTER FOR SUPERVISION OF NORMAL PREGNANCY, ANTEPARTUM, UNSPECIFIED GRAVIDITY: Primary | ICD-10-CM

## 2024-07-10 PROCEDURE — 0502F SUBSEQUENT PRENATAL CARE: CPT | Performed by: FAMILY MEDICINE

## 2024-07-10 NOTE — PROGRESS NOTES
Chief Complaint   Patient presents with    Routine Prenatal Visit        Patient identified by name and . Patient is a  at 26w5d.    Taking prenatal vitamins: Yes  Leakage of fluid: No  Vaginal bleeding: No  Feeling baby move if over 20 weeks: Yes  Contractions: No  Pain: No    Vitals:    07/10/24 0910   BP: 117/73   Resp: 16   SpO2: 98%   Weight: 68 kg (150 lb)        Immunization History   Administered Date(s) Administered    Hep B, ENGERIX-B, (age 20y+), IM, 1mL 04/15/2024, 2024       1. Have you been to the ER, urgent care clinic since your last visit?  Hospitalized since your last visit?No    2. Have you seen or consulted any other health care providers outside of the [facility] since your last visit?  Include any pap smears or colon screening. No

## 2024-07-10 NOTE — PROGRESS NOTES
I reviewed with the resident the medical history and the resident's findings on the physical examination.  I discussed with the resident the patient's diagnosis and concur with the plan.    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

## 2024-07-11 ENCOUNTER — ROUTINE PRENATAL (OUTPATIENT)
Age: 29
End: 2024-07-11
Payer: COMMERCIAL

## 2024-07-11 VITALS — HEART RATE: 108 BPM | SYSTOLIC BLOOD PRESSURE: 121 MMHG | DIASTOLIC BLOOD PRESSURE: 72 MMHG

## 2024-07-11 DIAGNOSIS — Z3A.26 26 WEEKS GESTATION OF PREGNANCY: ICD-10-CM

## 2024-07-11 DIAGNOSIS — Z14.8 CARRIER OF GENETIC DISORDER: Primary | ICD-10-CM

## 2024-07-11 LAB
ERYTHROCYTE [DISTWIDTH] IN BLOOD BY AUTOMATED COUNT: 13.5 % (ref 11.5–14.5)
GLUCOSE 1H P 100 G GLC PO SERPL-MCNC: 95 MG/DL (ref 65–140)
HCT VFR BLD AUTO: 31.7 % (ref 35–47)
HGB BLD-MCNC: 10.3 G/DL (ref 11.5–16)
MCH RBC QN AUTO: 30 PG (ref 26–34)
MCHC RBC AUTO-ENTMCNC: 32.5 G/DL (ref 30–36.5)
MCV RBC AUTO: 92.4 FL (ref 80–99)
NRBC # BLD: 0 K/UL (ref 0–0.01)
NRBC BLD-RTO: 0 PER 100 WBC
PLATELET # BLD AUTO: 468 K/UL (ref 150–400)
PMV BLD AUTO: 8.9 FL (ref 8.9–12.9)
RBC # BLD AUTO: 3.43 M/UL (ref 3.8–5.2)
WBC # BLD AUTO: 12.1 K/UL (ref 3.6–11)

## 2024-07-11 PROCEDURE — 76816 OB US FOLLOW-UP PER FETUS: CPT | Performed by: OBSTETRICS & GYNECOLOGY

## 2024-07-11 PROCEDURE — 99212 OFFICE O/P EST SF 10 MIN: CPT | Performed by: OBSTETRICS & GYNECOLOGY

## 2024-07-12 NOTE — PROCEDURES
PATIENT: AIDAN REID   -  : 1995   -  DOS:2024   -  INTERPRETING PROVIDER:Minnie Ramírez,   Indication  ========    Maternal carrier for SLO    Method  ======    Transabdominal ultrasound examination. View: Sufficient    Pregnancy  =========    Pablo pregnancy. Number of fetuses: 1    Dating  ======    LMP on: 2024  GA by LMP 26 w + 6 d  LISETTE by LMP: 10/11/2024  Previous Ultrasound on: 3/18/2024  Type of prior assessment: GA  GA at prior assessment date 10 w + 3 d  GA by previous U/S 26 w + 6 d  LISETTE by previous Ultrasound: 10/11/2024  Ultrasound examination on: 2024  GA by U/S based upon: AC, BPD, Femur, HC  GA by U/S 26 w + 1 d  LISETTE by U/S: 10/16/2024  Assigned: based on the LMP, selected on 2024  Assigned GA 26 w + 6 d  Assigned LISETTE: 10/11/2024    Fetal Biometry  ============    Standard  BPD 63.5 mm 25w 5d 9% Hadlock  OFD 84.0 mm 27w 2d 63% Rupesh  .2 mm 25w 3d 2% Hadlock  Cerebellum tr 30.5 mm 26w 3d 60% Hill  .3 mm 26w 0d 18% Hadlock  Femur 51.7 mm 27w 4d 59% Hadlock   g 26w 2d 26% Hadlock  EFW (lb) 2 lb  EFW (oz) 1 oz  EFW by: Hadlock (BPD-HC-AC-FL)  Extended   5.4 mm  CM 6.9 mm  63% Nicolaides  Head / Face / Neck  Nasal bone: present  Other Structures   bpm    General Evaluation  ==============    Cardiac activity present.  bpm. Fetal movements: visualized. Presentation: Cephalic  Placenta: Placental site: posterior, appropriate distance from the internal os  Umbilical cord: Cord vessels: 3 vessel cord. Insertion site: central  Amniotic fluid: Amount of AF: normal. MVP 3.9 cm    Fetal Anatomy  ===========    Lateral ventricles: normal  Cavum septi pellucidi: normal  Cerebellum: normal  Cisterna magna: normal  Head / Neck  Vermis: normal  Profile: normal  Face  Nasal bone: present  4-chamber view: normal  RVOT view: normal  LVOT view: normal  3-vessel-trachea view: normal  Heart / Thorax  Situs: situs solitus (normal)  Cardiac

## 2024-07-17 DIAGNOSIS — O99.019 ANEMIA DURING PREGNANCY: Primary | ICD-10-CM

## 2024-07-17 RX ORDER — FERROUS SULFATE 325(65) MG
325 TABLET ORAL EVERY OTHER DAY
Qty: 45 TABLET | Refills: 1 | Status: SHIPPED | OUTPATIENT
Start: 2024-07-17

## 2024-07-23 ENCOUNTER — TELEPHONE (OUTPATIENT)
Age: 29
End: 2024-07-23

## 2024-07-23 DIAGNOSIS — N92.6 MISSED MENSES: ICD-10-CM

## 2024-07-23 RX ORDER — PNV NO.95/FERROUS FUM/FOLIC AC 28MG-0.8MG
1 TABLET ORAL DAILY
Qty: 30 TABLET | Refills: 5 | Status: SHIPPED | OUTPATIENT
Start: 2024-07-23

## 2024-07-23 NOTE — TELEPHONE ENCOUNTER
----- Message from Karthik Gomez, DO sent at 7/17/2024  2:01 PM EDT -----  Anemia: Carina please inform pt to start iron every other day, script sent to pharmacy on file.  Suspect this is why plt are slightly elevated.  Can recheck later in 3rd tri.  WBC is normal in pregnancy.  GTT normal.

## 2024-07-23 NOTE — TELEPHONE ENCOUNTER
Called patient with  to inform her of recent lab results. Patient verbalized understanding and will  iron.

## 2024-07-28 NOTE — PROGRESS NOTES
[address] [place] [address]    Office [phone], Fax [phone]    Return OB Visit       Subjective:    Elvira Neil 28 y.o.  at GA:  29w3d, LISETTE: Estimated Date of Delivery: 10/11/24 by LMP and confirmed by US at 10w.     Patient reports feeling well. No new concerns at this time, had one time greenish vaginal discharge 4 days ago, without any other symptoms. Patient denies headache, visual disturbances, CP, SOB, RUQ pain, dysuria, and calf tenderness.     Pregnancy complicated by Hep nonimmune, Mari Lemli opitz carrier.    OB History:  See Chart    LOF: No   Vaginal bleeding: No   Fetal movement (after 20 weeks):yes   Contractions: no   Taking prenatal vitamins:yes    Taking ASA: no        Allergies- reviewed:   No Known Allergies  Medications- reviewed:   Current Outpatient Medications   Medication Sig    Prenatal Vit-Fe Fumarate-FA (PRENATAL VITAMINS) 28-0.8 MG TABS Take 1 tablet by mouth daily    ferrous sulfate (IRON 325) 325 (65 Fe) MG tablet Take 1 tablet by mouth every other day     No current facility-administered medications for this visit.     Past Medical History- reviewed:  Past Medical History:   Diagnosis Date    Ectopic pregnancy      Past Surgical History- reviewed:   History reviewed. No pertinent surgical history.  Social History- reviewed:  Social History     Socioeconomic History    Marital status: Single     Spouse name: Not on file    Number of children: 1    Years of education: Not on file    Highest education level: Not on file   Occupational History    Not on file   Tobacco Use    Smoking status: Never     Passive exposure: Never    Smokeless tobacco: Never   Vaping Use    Vaping Use: Never used   Substance and Sexual Activity    Alcohol use: Not Currently    Drug use: Never    Sexual activity: Yes     Partners: Male   Other Topics Concern    Not on file   Social History Narrative    Not on file     Social Determinants of Health     Financial

## 2024-07-28 NOTE — PATIENT INSTRUCTIONS
Future Appointments   Date Time Provider Department Center   2024 11:15 AM ULTRASOUND 1 [facility] BS [facility]   2024  9:00 AM Sohail Jimenez MD [facility] BS AMB   2024  9:00 AM Rose Mary Stephens MD [facility] BS AMB     Ultrasound  The [address], [address] [place] [address]  Tel: [phone]

## 2024-07-29 ENCOUNTER — ROUTINE PRENATAL (OUTPATIENT)
Age: 29
End: 2024-07-29
Payer: COMMERCIAL

## 2024-07-29 VITALS
OXYGEN SATURATION: 97 % | DIASTOLIC BLOOD PRESSURE: 74 MMHG | WEIGHT: 151 LBS | BODY MASS INDEX: 25.16 KG/M2 | TEMPERATURE: 98.7 F | RESPIRATION RATE: 15 BRPM | HEIGHT: 65 IN | SYSTOLIC BLOOD PRESSURE: 115 MMHG | HEART RATE: 89 BPM

## 2024-07-29 DIAGNOSIS — Z3A.29 29 WEEKS GESTATION OF PREGNANCY: Primary | ICD-10-CM

## 2024-07-29 PROCEDURE — 90715 TDAP VACCINE 7 YRS/> IM: CPT

## 2024-07-29 PROCEDURE — 0502F SUBSEQUENT PRENATAL CARE: CPT

## 2024-07-29 PROCEDURE — PBSHW TDAP, BOOSTRIX, (AGE 10 YRS+), IM

## 2024-07-29 NOTE — PROGRESS NOTES
Session Code 47794  / : Mariana #[id number]    Chief Complaint   Patient presents with    Routine Prenatal Visit     Patient is concerned about green discharge that happened on Friday but hasn't happened since then. Denied itching, burning, or having any odor. Stated it looked like green mucus        Patient identified with 2 patient identifiers (name and D.O.B)    Patient is a  at 29w3d    Leakage of Fluid: NO  Vaginal Bleeding: NO  Fetal Movement if > 20 weeks: YES  Prenatal vitamins: YES  Having Contractions: NO  Pain: NO    LMP 2024 (Exact Date)     Immunization History   Administered Date(s) Administered    Hep B, ENGERIX-B, (age 20y+), IM, 1mL 04/15/2024, 2024       1. Have you been to the ER, urgent care clinic since your last visit?  Hospitalized since your last visit?NO    2. Have you seen or consulted any other health care providers outside of the [facility] since your last visit?  Include any pap smears or colon screening. NO

## 2024-07-29 NOTE — PROGRESS NOTES
I reviewed with the resident the medical history and the resident's findings on the physical examination.  I discussed with the resident the patient's diagnosis and concur with the plan.    27yo  @ 29w3d by LMP=10 wk dating scan   IUP: RH pos, anatomy normal, mfm recommended repeat growth in 6wk - tomorrow,  GTT passed, Tdap today   Hep B NI: s/p 1st dose 4/15, 2nd   Smith Lemli Opitz carrier: seen by GC, original plan was for FOB to come for testing but pt says today that won't be happening because he is not local  Anemia: hgb 10.3, started iron, will recheck later in 3rd tri   Hx 2nd tri loss: collect APS labs

## 2024-08-06 LAB
APTT HEX PL PPP: 3 SEC
APTT IMM NP PPP: NORMAL SEC
APTT PPP 1:1 SALINE: NORMAL SEC
APTT PPP: 26.9 SEC
B2 GLYCOPROT1 IGA SER-ACNC: <10 SAU
B2 GLYCOPROT1 IGG SER-ACNC: <10 SGU
B2 GLYCOPROT1 IGM SER-ACNC: <10 SMU
CARDIOLIPIN IGA SER IA-ACNC: <10 APL
CARDIOLIPIN IGG SER IA-ACNC: <10 GPL
CARDIOLIPIN IGM SER IA-ACNC: <10 MPL
CONFIRM APTT: 1.5 SEC
CONFIRM DRVVT: NORMAL SEC
LABORATORY COMMENT REPORT: NORMAL
PROTHROM IGG SERPL-ACNC: 1 G UNITS
PS IGG SER IA-ACNC: 0 GPS
PS IGM SER IA-ACNC: 1 MPS
SCREEN DRVVT/NORMAL: NORMAL RATIO
SCREEN DRVVT: 30.2 SEC

## 2024-08-08 ENCOUNTER — TELEPHONE (OUTPATIENT)
Age: 29
End: 2024-08-08

## 2024-08-08 ENCOUNTER — ROUTINE PRENATAL (OUTPATIENT)
Age: 29
End: 2024-08-08
Payer: COMMERCIAL

## 2024-08-08 VITALS — SYSTOLIC BLOOD PRESSURE: 112 MMHG | HEART RATE: 82 BPM | DIASTOLIC BLOOD PRESSURE: 73 MMHG

## 2024-08-08 DIAGNOSIS — Z3A.30 30 WEEKS GESTATION OF PREGNANCY: Primary | ICD-10-CM

## 2024-08-08 DIAGNOSIS — Z87.59 HISTORY OF INTRAUTERINE FETAL DEATH IN PREVIOUS PREGNANCY: ICD-10-CM

## 2024-08-08 DIAGNOSIS — Z14.8 CARRIER OF GENETIC DISORDER: ICD-10-CM

## 2024-08-08 PROCEDURE — 99214 OFFICE O/P EST MOD 30 MIN: CPT | Performed by: OBSTETRICS & GYNECOLOGY

## 2024-08-08 PROCEDURE — 76816 OB US FOLLOW-UP PER FETUS: CPT | Performed by: OBSTETRICS & GYNECOLOGY

## 2024-08-08 NOTE — TELEPHONE ENCOUNTER
I spoke to the patient, Elvira Neil, today over the phone to review options for carrier screening for the father of the baby (FOB). FOB carrier screening was initially ordered after her genetic counseling appointment in May and a saliva kit was provided to the patient.    The patient reports today that the FOB is not local and therefore she could not give him the saliva kit and he could not go to their OB's office for a blood draw. I offered to have the lab ship a saliva collection kit directly to the FOB, which the patient DECLINED today. She stated that they will not pursue FOB carrier screening at this time.    The patient verbalized her understanding of the information as it was presented to her today; she denies any further questions or concerns at this time.    Orin Miller MS, [facility]  Licensed, Certified Genetic Counselor

## 2024-08-08 NOTE — PROCEDURES
to know fetal sex: yes    Findings  =======    Intrauterine Pablo pregnancy at 30w 6d by clinical dates.  EFW is 1569 g at 25%, abdominal circumference at 14%.  Anatomy visualized as stated above.  Amniotic fluid: normal.  Placenta is posterior, appropriate distance from the internal os on prior exam.  Cephalic presentation.    Consultation  ==========    AGA fetus for established dates.  Interval growth is noted.  The head circumfernce by the [employer] calculator is at the 5th%ile (Z score: -1.7). Not at a level to suspect microcephaly.  Please note that sonographic estimation of fetal/birth weight is not an exact science and clinical correlation is advised.  No demonstrable abnormalities for gestational age of assessment and technical constraints of the examination.     utilized: Sandy.      Findings discussed and images reviewed.  She was advised of the limitations of ultrasound to detect all fetal abnormalities or conditions or predict birth/fetal weight with exact precision, especially with the  constraints of the examination today.    History of Fetal Death:  Antiphospholipid panel was negative on 24.  Commence weekly  testing at 32 weeks .  F/U growth in ~ four weeks.    Carrier for SMO:  The FOB has not been tested.  Our genetic counselor told me a cheek swab was obtained; she will check to see if processed.  The patient prefers to have the infant evaluated after deliery.    Following discussion, sherelated an understanding of the information provided today and had no further questions.      Level of E/M: Moderate.    Recommendations  ==============    Commence weekly  testing at 32 weeks.  F/U growth in four weeks.  See above.    Coding  ======    Code: O35.2XX0  Description: Maternal care for (suspected) hereditary disease in fetus  Code: O09.29X  Description: Pregnancy with poor obstetric history  Code: Z3A.30  Description: Weeks of

## 2024-08-12 ENCOUNTER — ROUTINE PRENATAL (OUTPATIENT)
Age: 29
End: 2024-08-12

## 2024-08-12 VITALS
SYSTOLIC BLOOD PRESSURE: 102 MMHG | HEART RATE: 77 BPM | BODY MASS INDEX: 25.52 KG/M2 | TEMPERATURE: 98.2 F | HEIGHT: 65 IN | WEIGHT: 153.2 LBS | DIASTOLIC BLOOD PRESSURE: 68 MMHG | OXYGEN SATURATION: 98 %

## 2024-08-12 DIAGNOSIS — Z3A.31 31 WEEKS GESTATION OF PREGNANCY: Primary | ICD-10-CM

## 2024-08-12 DIAGNOSIS — O28.5 ABNORMAL GENETIC TEST DURING PREGNANCY: ICD-10-CM

## 2024-08-12 DIAGNOSIS — Z78.9 NONIMMUNE TO HEPATITIS B VIRUS: ICD-10-CM

## 2024-08-12 PROCEDURE — 0502F SUBSEQUENT PRENATAL CARE: CPT

## 2024-08-12 NOTE — PROGRESS NOTES
Elvira Neil is a 28 y.o. female      Chief Complaint   Patient presents with    Routine Prenatal Visit     31w 3d .  No vaginal bleeding or abnormal discharge.  Positive fetal movement.  No contractions.        \"Have you been to the ER, urgent care clinic since your last visit?  Hospitalized since your last visit?\"    NO    “Have you seen or consulted any other health care providers outside of [facility] since your last visit?”    NO            Click Here for Release of Records Request    Vitals:    08/12/24 0846   BP: 102/68   Site: Right Upper Arm   Position: Sitting   Cuff Size: Medium Adult   Pulse: 77   Temp: 98.2 °F (36.8 °C)   TempSrc: Oral   SpO2: 98%   Weight: 69.5 kg (153 lb 3.2 oz)   Height: 1.64 m (5' 4.57\")           Medication Reconciliation Completed, changes notes. Please Update medication list.  
I reviewed with the resident the medical history and the resident's findings on the physical examination.  I discussed with the resident the patient's diagnosis and concur with the plan.    27yo  @ 31w3d by LMP=10 wk dating scan   IUP: RH pos, anatomy normal, mfm recommended repeat growth in 6wk - tomorrow,  GTT passed, +Tdap, EFW 25th% on  (HC <1st% but MFM does not suspect microcephaly - has f/up planned in 4w)   Hep B NI: s/p 1st dose 4/15, 2nd   Smith Lemli Opitz carrier: seen by GC, original plan was for FOB to come for testing but pt says today that won't be happening because he is not local  Anemia: hgb 10.3, taking iron, will recheck later in 3rd tri   Hx 2nd tri loss: APS labs neg   
LISETTE 10/11/2024, by Last Menstrual Period here for return OB visit     1.SIUP at 31w3d  -PNL:  O+, ab screen neg. Hgb 13.0. Hgb frac wnl. GC/CT neg. HCV/HIV/HBV/RPR negative. HBV non-immune. Rubella/VZV immune. Ucx NG. Pap NILM. GTT 1h passed.   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 Cephalic. Posterior placenta. 3V cord. Normal anatomy    The head circumfernce by the [employer] calculator is at the 5th%ile (Z score: -1.7). Not at a level to suspect microcephaly     - Tdap 7/29/24  - Follow ups:    - MFM: 8/22/24   - Clinic: 8/26/24 with Dr. Stephens at 09:00       H/o of miscarriage in 2nd trimester 16w0d, 2nd pregancy.   - APS testing negative     Mari-Lemli-Opitz Carrier: Met with genetic counseling. Declined invasive testing. Original plan was for FOB to come for testing but pt says today that won't be happening because he is not local. AFP screening negative.      Hep B non-immune: S/p immunization on 4/15, 6/11  - Due for final immunization in October 2024     Anemia: on iron supplement every other day.   - Continue iron supplement every other day.      Thrombocytosis, on CBC on 7/10. M/l due to anemia  - Repeat CBC in roughly 1 month.     BIRTH PLAN  Continuity Provider: Tony Cuadra. in labor: TBD  Feeding plan: TBD  Circ if male: TBD  Social: Denies Tobacco, EtoH or illict drugs.        No orders of the defined types were placed in this encounter.    Labor precautions discussed, including: Regular painful contractions, lasting for greater than one hour, taking your breath away; any vaginal bleeding; any leakage of fluid; or absent or decreased fetal movement. Call M.D. on call if any of these symptoms or signs occur.    I have discussed the diagnosis with the patient and the intended plan as seen in the

## 2024-08-26 ENCOUNTER — ROUTINE PRENATAL (OUTPATIENT)
Age: 29
End: 2024-08-26

## 2024-08-26 VITALS
BODY MASS INDEX: 25.33 KG/M2 | TEMPERATURE: 97.5 F | HEIGHT: 65 IN | SYSTOLIC BLOOD PRESSURE: 100 MMHG | HEART RATE: 86 BPM | DIASTOLIC BLOOD PRESSURE: 68 MMHG | WEIGHT: 152 LBS | RESPIRATION RATE: 17 BRPM | OXYGEN SATURATION: 99 %

## 2024-08-26 DIAGNOSIS — D50.8 OTHER IRON DEFICIENCY ANEMIA: ICD-10-CM

## 2024-08-26 DIAGNOSIS — Z3A.33 33 WEEKS GESTATION OF PREGNANCY: Primary | ICD-10-CM

## 2024-08-26 DIAGNOSIS — D75.839 THROMBOCYTOSIS: ICD-10-CM

## 2024-08-26 LAB
ERYTHROCYTE [DISTWIDTH] IN BLOOD BY AUTOMATED COUNT: 15.9 % (ref 11.5–14.5)
HCT VFR BLD AUTO: 35.4 % (ref 35–47)
HGB BLD-MCNC: 11 G/DL (ref 11.5–16)
MCH RBC QN AUTO: 28.7 PG (ref 26–34)
MCHC RBC AUTO-ENTMCNC: 31.1 G/DL (ref 30–36.5)
MCV RBC AUTO: 92.4 FL (ref 80–99)
NRBC # BLD: 0 K/UL (ref 0–0.01)
NRBC BLD-RTO: 0 PER 100 WBC
PLATELET # BLD AUTO: 441 K/UL (ref 150–400)
PMV BLD AUTO: 10 FL (ref 8.9–12.9)
RBC # BLD AUTO: 3.83 M/UL (ref 3.8–5.2)
WBC # BLD AUTO: 10.5 K/UL (ref 3.6–11)

## 2024-08-26 PROCEDURE — 0502F SUBSEQUENT PRENATAL CARE: CPT

## 2024-08-26 ASSESSMENT — PATIENT HEALTH QUESTIONNAIRE - PHQ9
SUM OF ALL RESPONSES TO PHQ QUESTIONS 1-9: 0
2. FEELING DOWN, DEPRESSED OR HOPELESS: NOT AT ALL
SUM OF ALL RESPONSES TO PHQ9 QUESTIONS 1 & 2: 0
SUM OF ALL RESPONSES TO PHQ QUESTIONS 1-9: 0
SUM OF ALL RESPONSES TO PHQ QUESTIONS 1-9: 0
1. LITTLE INTEREST OR PLEASURE IN DOING THINGS: NOT AT ALL
SUM OF ALL RESPONSES TO PHQ QUESTIONS 1-9: 0

## 2024-08-26 NOTE — PROGRESS NOTES
I reviewed with the resident the medical history and the resident's findings on the physical examination.  I discussed with the resident the patient's diagnosis and concur with the plan.    29yo  at 33w3d by LMP=10 wk dating scan     IUP: RH pos, anatomy normal, GTT passed, +Tdap, EFW 25th% on  (HC <1st% but MFM does not suspect microcephaly - has f/up planned)   Hep B NI: s/p 1st dose 4/15, 2nd   Mari Lemli Opimela carrier: seen by GC, original plan was for FOB to come for testing but not local  weekly  testing [facility]  Anemia: hgb 10.3, taking iron, will recheck later in 3rd tri   Hx 2nd tri loss: APS labs neg     Estimated Date of Delivery: 10/11/24    
Roomed by name and .    [facility] # [id number]  Int # [id number] (Jessica)    Chief Complaint   Patient presents with    Routine Prenatal Visit     .  33w 3d today.  No bleeding or LOF.  +FM.        Vitals:    24 0920   BP: 100/68   Pulse: 86   Resp: 17   Temp: 97.5 °F (36.4 °C)   TempSrc: Temporal   SpO2: 99%   Weight: 68.9 kg (152 lb)   Height: 1.638 m (5' 4.5\")          \"Have you been to the ER, urgent care clinic since your last visit?  Hospitalized since your last visit?\"    NO    “Have you seen or consulted any other health care providers outside of [facility] since your last visit?”    NO            Click Here for Release of Records Request     
hard   Food Insecurity: Food Insecurity Present (2024)    Hunger Vital Sign     Worried About Running Out of Food in the Last Year: Sometimes true     Ran Out of Food in the Last Year: Sometimes true   Transportation Needs: Unmet Transportation Needs (2024)    PRAPARE - Transportation     Lack of Transportation (Medical): Not on file     Lack of Transportation (Non-Medical): Yes   Physical Activity: Not on file   Stress: Not on file   Social Connections: Not on file   Intimate Partner Violence: Not on file   Housing Stability: Unknown (2024)    Housing Stability Vital Sign     Unable to Pay for Housing in the Last Year: Not on file     Number of Places Lived in the Last Year: Not on file     Unstable Housing in the Last Year: No     Immunizations- reviewed:   Immunization History   Administered Date(s) Administered    Hep B, ENGERIX-B, (age 20y+), IM, 1mL 04/15/2024, 2024    TDaP, ADACEL (age 10y-64y), BOOSTRIX (age 10y+), IM, 0.5mL 2024       OB History- reviewed:  OB History    Para Term  AB Living   4 1 1   2 1   SAB IAB Ectopic Molar Multiple Live Births   1   1     1      # Outcome Date GA Lbr Jake/2nd Weight Sex Type Anes PTL Lv   4 Current            3 Term  38w0d   F Vag-Spont   LEO   2 SAB  16w0d          1 Ectopic  12w0d               Objective:   LMP 2024 (Exact Date)     Physical Exam:  GENERAL APPEARANCE: alert, well appearing, in no apparent distress  ABDOMEN: gravid, fundal height 33 cm, FHT present at an average of 130 bpm  PSYCH: normal mood and affect      Labs  No results found for this or any previous visit (from the past 12 hour(s)).    Assessment     Elvira was seen for ARELY visit.       Plan   28 y.o.  at 32w3d by LMP (c/w 10w US), Estimated Date of Delivery: 10/11/24 here for return OB visit.     1.SIUP at 33w3d  -PNL:  O+, ab screen neg. Hgb 13.0. Hgb frac wnl. GC/CT neg. HCV/HIV/HBV/RPR negative. HBV non-immune. Rubella/VZV

## 2024-09-05 ENCOUNTER — ROUTINE PRENATAL (OUTPATIENT)
Age: 29
End: 2024-09-05
Payer: COMMERCIAL

## 2024-09-05 VITALS — DIASTOLIC BLOOD PRESSURE: 69 MMHG | SYSTOLIC BLOOD PRESSURE: 107 MMHG | HEART RATE: 87 BPM

## 2024-09-05 DIAGNOSIS — Z3A.34 34 WEEKS GESTATION OF PREGNANCY: Primary | ICD-10-CM

## 2024-09-05 PROCEDURE — 76816 OB US FOLLOW-UP PER FETUS: CPT | Performed by: OBSTETRICS & GYNECOLOGY

## 2024-09-05 PROCEDURE — 76819 FETAL BIOPHYS PROFIL W/O NST: CPT | Performed by: OBSTETRICS & GYNECOLOGY

## 2024-09-06 NOTE — PROCEDURES
PATIENT: AIDAN REID   -  : 1995   -  DOS:2024   -  INTERPRETING PROVIDER:Minnie Ramírez,   Indication  ========    Maternal carrier for SLO; FOB not tested?    Method  ======    Transabdominal ultrasound examination. View: Sufficient    Pregnancy  =========    Pablo pregnancy. Number of fetuses: 1    Dating  ======    LMP on: 2024  GA by LMP 34 w + 6 d  LISETTE by LMP: 10/11/2024  Previous Ultrasound on: 3/18/2024  Type of prior assessment: GA  GA at prior assessment date 10 w + 3 d  GA by previous U/S 34 w + 6 d  LISETTE by previous Ultrasound: 10/11/2024  Ultrasound examination on: 2024  GA by U/S based upon: AC, BPD, Femur, HC  GA by U/S 34 w + 1 d  LISETTE by U/S: 10/16/2024  Assigned: based on the LMP, selected on 2024  Assigned GA 34 w + 6 d  Assigned LISETTE: 10/11/2024    Fetal Biometry  ============    Standard  BPD 82.8 mm 33w 2d 12% Hadlock  .9 mm 36w 0d 77% Rupesh  .8 mm 34w 1d 8% Hadlock  Cerebellum tr 43.0 mm 33w 5d 14% Hill  .4 mm 33w 6d 29% Hadlock  Femur 68.9 mm 35w 3d 56% Hadlock  EFW 2,395 g 34w 1d 31% Hadlock  EFW (lb) 5 lb  EFW (oz) 4 oz  EFW by: Hadlock (BPD-HC-AC-FL)  Extended   4.5 mm  Head / Face / Neck  Nasal bone: present  Other Structures   bpm    General Evaluation  ==============    Cardiac activity present.  bpm. Fetal movements: visualized. Presentation: Cephalic  Placenta: Placental site: posterior, appropriate distance from the internal os on prior exam  Umbilical cord: Cord vessels: 3 vessel cord. Insertion site: central  Amniotic fluid: Amount of AF: normal. MVP 4.8 cm. MICHAEL 10.8 cm. Q1 2.3 cm, Q2 4.8 cm, Q3 0.0 cm, Q4 3.8 cm    Fetal Anatomy  ===========    Cavum septi pellucidi: documented previously  Face  Nasal bone: present  4-chamber view: documented previously  RVOT view: documented previously  LVOT view: documented previously  3-vessel view: documented

## 2024-09-09 ENCOUNTER — ROUTINE PRENATAL (OUTPATIENT)
Age: 29
End: 2024-09-09

## 2024-09-09 VITALS
BODY MASS INDEX: 25.86 KG/M2 | DIASTOLIC BLOOD PRESSURE: 80 MMHG | TEMPERATURE: 98 F | SYSTOLIC BLOOD PRESSURE: 119 MMHG | WEIGHT: 153 LBS | HEART RATE: 79 BPM | OXYGEN SATURATION: 98 %

## 2024-09-09 DIAGNOSIS — Z34.90 ENCOUNTER FOR SUPERVISION OF NORMAL PREGNANCY, ANTEPARTUM, UNSPECIFIED GRAVIDITY: Primary | ICD-10-CM

## 2024-09-09 PROCEDURE — 0502F SUBSEQUENT PRENATAL CARE: CPT

## 2024-09-10 ENCOUNTER — TELEPHONE (OUTPATIENT)
Age: 29
End: 2024-09-10

## 2024-09-12 ENCOUNTER — ROUTINE PRENATAL (OUTPATIENT)
Age: 29
End: 2024-09-12
Payer: COMMERCIAL

## 2024-09-12 VITALS — DIASTOLIC BLOOD PRESSURE: 72 MMHG | HEART RATE: 76 BPM | SYSTOLIC BLOOD PRESSURE: 120 MMHG

## 2024-09-12 DIAGNOSIS — Z14.8 CARRIER OF GENETIC DISORDER: Primary | ICD-10-CM

## 2024-09-12 DIAGNOSIS — Z34.91 INITIAL OBSTETRIC VISIT IN FIRST TRIMESTER: ICD-10-CM

## 2024-09-12 PROCEDURE — 76819 FETAL BIOPHYS PROFIL W/O NST: CPT | Performed by: OBSTETRICS & GYNECOLOGY

## 2024-09-16 ENCOUNTER — ROUTINE PRENATAL (OUTPATIENT)
Age: 29
End: 2024-09-16
Payer: COMMERCIAL

## 2024-09-16 VITALS
SYSTOLIC BLOOD PRESSURE: 123 MMHG | OXYGEN SATURATION: 97 % | DIASTOLIC BLOOD PRESSURE: 79 MMHG | HEART RATE: 94 BPM | BODY MASS INDEX: 25.66 KG/M2 | TEMPERATURE: 98.1 F | WEIGHT: 154 LBS | RESPIRATION RATE: 18 BRPM | HEIGHT: 65 IN

## 2024-09-16 DIAGNOSIS — O09.93 SUPERVISION OF HIGH-RISK PREGNANCY, THIRD TRIMESTER: Primary | ICD-10-CM

## 2024-09-16 DIAGNOSIS — O23.593 VAGINITIS AFFECTING PREGNANCY IN THIRD TRIMESTER, ANTEPARTUM: ICD-10-CM

## 2024-09-16 LAB
C. TRACHOMATIS, EXTERNAL RESULT: NEGATIVE
GBS, EXTERNAL RESULT: NEGATIVE
N. GONORRHOEAE, EXTERNAL RESULT: NEGATIVE
WET PREP (POC): NORMAL

## 2024-09-16 PROCEDURE — 0502F SUBSEQUENT PRENATAL CARE: CPT

## 2024-09-16 PROCEDURE — 87210 SMEAR WET MOUNT SALINE/INK: CPT

## 2024-09-16 PROCEDURE — PBSHW AMB POC SMEAR, STAIN & INTERPRET, WET MOUNT

## 2024-09-16 RX ORDER — MICONAZOLE NITRATE 2 %
CREAM WITH APPLICATOR VAGINAL
Qty: 90 G | Refills: 1 | Status: SHIPPED | OUTPATIENT
Start: 2024-09-16 | End: 2024-09-23

## 2024-09-16 RX ORDER — MICONAZOLE NITRATE 2 %
CREAM WITH APPLICATOR VAGINAL
Qty: 90 G | Refills: 1 | Status: SHIPPED | OUTPATIENT
Start: 2024-09-16 | End: 2024-09-16

## 2024-09-16 ASSESSMENT — PATIENT HEALTH QUESTIONNAIRE - PHQ9
1. LITTLE INTEREST OR PLEASURE IN DOING THINGS: NOT AT ALL
SUM OF ALL RESPONSES TO PHQ9 QUESTIONS 1 & 2: 0
2. FEELING DOWN, DEPRESSED OR HOPELESS: NOT AT ALL
SUM OF ALL RESPONSES TO PHQ QUESTIONS 1-9: 0

## 2024-09-19 ENCOUNTER — ROUTINE PRENATAL (OUTPATIENT)
Age: 29
End: 2024-09-19
Payer: COMMERCIAL

## 2024-09-19 VITALS — HEART RATE: 80 BPM | DIASTOLIC BLOOD PRESSURE: 70 MMHG | SYSTOLIC BLOOD PRESSURE: 108 MMHG

## 2024-09-19 DIAGNOSIS — Z3A.36 36 WEEKS GESTATION OF PREGNANCY: ICD-10-CM

## 2024-09-19 DIAGNOSIS — Z14.8 CARRIER OF GENETIC DISORDER: ICD-10-CM

## 2024-09-19 DIAGNOSIS — Z75.8 LANGUAGE BARRIER: ICD-10-CM

## 2024-09-19 DIAGNOSIS — Z60.3 LANGUAGE BARRIER: ICD-10-CM

## 2024-09-19 DIAGNOSIS — Z87.59 HISTORY OF INTRAUTERINE FETAL DEATH IN PREVIOUS PREGNANCY: Primary | ICD-10-CM

## 2024-09-19 PROCEDURE — 76819 FETAL BIOPHYS PROFIL W/O NST: CPT | Performed by: OBSTETRICS & GYNECOLOGY

## 2024-09-19 PROCEDURE — 99212 OFFICE O/P EST SF 10 MIN: CPT | Performed by: OBSTETRICS & GYNECOLOGY

## 2024-09-19 SDOH — SOCIAL STABILITY - SOCIAL INSECURITY: ACCULTURATION DIFFICULTY: Z60.3

## 2024-09-20 LAB
BACTERIA SPEC CULT: NORMAL
C TRACH RRNA SPEC QL NAA+PROBE: NEGATIVE
N GONORRHOEA RRNA SPEC QL NAA+PROBE: NEGATIVE
SERVICE CMNT-IMP: NORMAL
SPECIMEN SOURCE: NORMAL
T VAGINALIS RRNA SPEC QL NAA+PROBE: NEGATIVE

## 2024-09-23 ENCOUNTER — ROUTINE PRENATAL (OUTPATIENT)
Age: 29
End: 2024-09-23
Payer: COMMERCIAL

## 2024-09-23 VITALS
HEIGHT: 65 IN | TEMPERATURE: 98.3 F | OXYGEN SATURATION: 98 % | DIASTOLIC BLOOD PRESSURE: 74 MMHG | WEIGHT: 153.8 LBS | RESPIRATION RATE: 18 BRPM | HEART RATE: 78 BPM | BODY MASS INDEX: 25.62 KG/M2 | SYSTOLIC BLOOD PRESSURE: 113 MMHG

## 2024-09-23 DIAGNOSIS — Z23 NEED FOR VACCINATION: ICD-10-CM

## 2024-09-23 DIAGNOSIS — Z3A.37 37 WEEKS GESTATION OF PREGNANCY: Primary | ICD-10-CM

## 2024-09-23 DIAGNOSIS — D50.8 OTHER IRON DEFICIENCY ANEMIA: ICD-10-CM

## 2024-09-23 DIAGNOSIS — Z87.59 HX OF ONE MISCARRIAGE: ICD-10-CM

## 2024-09-23 DIAGNOSIS — Z78.9 NONIMMUNE TO HEPATITIS B VIRUS: ICD-10-CM

## 2024-09-23 DIAGNOSIS — D75.839 THROMBOCYTOSIS: ICD-10-CM

## 2024-09-23 PROCEDURE — 90661 CCIIV3 VAC ABX FR 0.5 ML IM: CPT

## 2024-09-23 PROCEDURE — 0502F SUBSEQUENT PRENATAL CARE: CPT

## 2024-09-23 PROCEDURE — PBSHW INFLUENZA, FLUCELVAX TRIVALENT, (AGE 6 MO+) IM, PRESERVATIVE FREE, 0.5ML

## 2024-09-26 ENCOUNTER — ROUTINE PRENATAL (OUTPATIENT)
Age: 29
End: 2024-09-26
Payer: COMMERCIAL

## 2024-09-26 VITALS — SYSTOLIC BLOOD PRESSURE: 122 MMHG | HEART RATE: 66 BPM | DIASTOLIC BLOOD PRESSURE: 60 MMHG

## 2024-09-26 DIAGNOSIS — Z14.8 CARRIER OF GENETIC DISORDER: Primary | ICD-10-CM

## 2024-09-26 PROCEDURE — 76819 FETAL BIOPHYS PROFIL W/O NST: CPT | Performed by: STUDENT IN AN ORGANIZED HEALTH CARE EDUCATION/TRAINING PROGRAM

## 2024-09-26 PROCEDURE — 99213 OFFICE O/P EST LOW 20 MIN: CPT | Performed by: STUDENT IN AN ORGANIZED HEALTH CARE EDUCATION/TRAINING PROGRAM

## 2024-09-30 ENCOUNTER — HOSPITAL ENCOUNTER (INPATIENT)
Facility: HOSPITAL | Age: 29
LOS: 1 days | Discharge: HOME | DRG: 560 | End: 2024-10-01
Attending: FAMILY MEDICINE | Admitting: FAMILY MEDICINE
Payer: COMMERCIAL

## 2024-09-30 PROBLEM — Z3A.38 38 WEEKS GESTATION OF PREGNANCY: Status: ACTIVE | Noted: 2024-09-30

## 2024-09-30 LAB
ABO + RH BLD: NORMAL
BASOPHILS # BLD: 0.1 K/UL (ref 0–0.1)
BASOPHILS NFR BLD: 1 % (ref 0–1)
BLOOD GROUP ANTIBODIES SERPL: NORMAL
DIFFERENTIAL METHOD BLD: ABNORMAL
EOSINOPHIL # BLD: 0.1 K/UL (ref 0–0.4)
EOSINOPHIL NFR BLD: 1 % (ref 0–7)
ERYTHROCYTE [DISTWIDTH] IN BLOOD BY AUTOMATED COUNT: 16.6 % (ref 11.5–14.5)
HCT VFR BLD AUTO: 35.9 % (ref 35–47)
HGB BLD-MCNC: 11.9 G/DL (ref 11.5–16)
IMM GRANULOCYTES # BLD AUTO: 0 K/UL (ref 0–0.04)
IMM GRANULOCYTES NFR BLD AUTO: 0 % (ref 0–0.5)
LYMPHOCYTES # BLD: 3.8 K/UL (ref 0.8–3.5)
LYMPHOCYTES NFR BLD: 36 % (ref 12–49)
MCH RBC QN AUTO: 28.6 PG (ref 26–34)
MCHC RBC AUTO-ENTMCNC: 33.1 G/DL (ref 30–36.5)
MCV RBC AUTO: 86.3 FL (ref 80–99)
MONOCYTES # BLD: 0.5 K/UL (ref 0–1)
MONOCYTES NFR BLD: 5 % (ref 5–13)
NEUTS SEG # BLD: 6.2 K/UL (ref 1.8–8)
NEUTS SEG NFR BLD: 57 % (ref 32–75)
NRBC # BLD: 0 K/UL (ref 0–0.01)
NRBC BLD-RTO: 0 PER 100 WBC
PLATELET # BLD AUTO: 450 K/UL (ref 150–400)
PMV BLD AUTO: 9.8 FL (ref 8.9–12.9)
RBC # BLD AUTO: 4.16 M/UL (ref 3.8–5.2)
RPR SER QL: NONREACTIVE
SPECIMEN EXP DATE BLD: NORMAL
WBC # BLD AUTO: 10.7 K/UL (ref 3.6–11)

## 2024-09-30 PROCEDURE — 6370000000 HC RX 637 (ALT 250 FOR IP)

## 2024-09-30 PROCEDURE — 2580000003 HC RX 258

## 2024-09-30 PROCEDURE — 86592 SYPHILIS TEST NON-TREP QUAL: CPT

## 2024-09-30 PROCEDURE — 7220000101 HC DELIVERY VAGINAL/SINGLE: Performed by: STUDENT IN AN ORGANIZED HEALTH CARE EDUCATION/TRAINING PROGRAM

## 2024-09-30 PROCEDURE — 86900 BLOOD TYPING SEROLOGIC ABO: CPT

## 2024-09-30 PROCEDURE — 1120000000 HC RM PRIVATE OB

## 2024-09-30 PROCEDURE — 4A1HXCZ MONITORING OF PRODUCTS OF CONCEPTION, CARDIAC RATE, EXTERNAL APPROACH: ICD-10-PCS | Performed by: OBSTETRICS & GYNECOLOGY

## 2024-09-30 PROCEDURE — 86901 BLOOD TYPING SEROLOGIC RH(D): CPT

## 2024-09-30 PROCEDURE — 10907ZC DRAINAGE OF AMNIOTIC FLUID, THERAPEUTIC FROM PRODUCTS OF CONCEPTION, VIA NATURAL OR ARTIFICIAL OPENING: ICD-10-PCS | Performed by: OBSTETRICS & GYNECOLOGY

## 2024-09-30 PROCEDURE — 86850 RBC ANTIBODY SCREEN: CPT

## 2024-09-30 PROCEDURE — 85025 COMPLETE CBC W/AUTO DIFF WBC: CPT

## 2024-09-30 PROCEDURE — 36415 COLL VENOUS BLD VENIPUNCTURE: CPT

## 2024-09-30 PROCEDURE — 0HQ9XZZ REPAIR PERINEUM SKIN, EXTERNAL APPROACH: ICD-10-PCS | Performed by: OBSTETRICS & GYNECOLOGY

## 2024-09-30 PROCEDURE — 94761 N-INVAS EAR/PLS OXIMETRY MLT: CPT

## 2024-09-30 PROCEDURE — 59400 OBSTETRICAL CARE: CPT | Performed by: STUDENT IN AN ORGANIZED HEALTH CARE EDUCATION/TRAINING PROGRAM

## 2024-09-30 PROCEDURE — 7210000100 HC LABOR FEE PER 1 HR: Performed by: STUDENT IN AN ORGANIZED HEALTH CARE EDUCATION/TRAINING PROGRAM

## 2024-09-30 PROCEDURE — 6360000002 HC RX W HCPCS

## 2024-09-30 RX ORDER — DOCUSATE SODIUM 100 MG/1
100 CAPSULE, LIQUID FILLED ORAL 2 TIMES DAILY
Status: DISCONTINUED | OUTPATIENT
Start: 2024-09-30 | End: 2024-10-01 | Stop reason: HOSPADM

## 2024-09-30 RX ORDER — SODIUM CHLORIDE, SODIUM LACTATE, POTASSIUM CHLORIDE, AND CALCIUM CHLORIDE .6; .31; .03; .02 G/100ML; G/100ML; G/100ML; G/100ML
500 INJECTION, SOLUTION INTRAVENOUS PRN
Status: DISCONTINUED | OUTPATIENT
Start: 2024-09-30 | End: 2024-10-01 | Stop reason: HOSPADM

## 2024-09-30 RX ORDER — ACETAMINOPHEN 500 MG
1000 TABLET ORAL EVERY 8 HOURS SCHEDULED
Status: DISCONTINUED | OUTPATIENT
Start: 2024-09-30 | End: 2024-10-01 | Stop reason: HOSPADM

## 2024-09-30 RX ORDER — CARBOPROST TROMETHAMINE 250 UG/ML
250 INJECTION, SOLUTION INTRAMUSCULAR PRN
Status: DISCONTINUED | OUTPATIENT
Start: 2024-09-30 | End: 2024-10-01 | Stop reason: HOSPADM

## 2024-09-30 RX ORDER — SODIUM CHLORIDE 0.9 % (FLUSH) 0.9 %
5-40 SYRINGE (ML) INJECTION EVERY 12 HOURS SCHEDULED
Status: DISCONTINUED | OUTPATIENT
Start: 2024-09-30 | End: 2024-10-01 | Stop reason: HOSPADM

## 2024-09-30 RX ORDER — ACETAMINOPHEN 325 MG/1
650 TABLET ORAL EVERY 4 HOURS PRN
Status: DISCONTINUED | OUTPATIENT
Start: 2024-09-30 | End: 2024-10-01 | Stop reason: HOSPADM

## 2024-09-30 RX ORDER — SODIUM CHLORIDE 9 MG/ML
25 INJECTION, SOLUTION INTRAVENOUS PRN
Status: DISCONTINUED | OUTPATIENT
Start: 2024-09-30 | End: 2024-10-01 | Stop reason: HOSPADM

## 2024-09-30 RX ORDER — SODIUM CHLORIDE 0.9 % (FLUSH) 0.9 %
5-40 SYRINGE (ML) INJECTION PRN
Status: DISCONTINUED | OUTPATIENT
Start: 2024-09-30 | End: 2024-10-01 | Stop reason: HOSPADM

## 2024-09-30 RX ORDER — ONDANSETRON 2 MG/ML
4 INJECTION INTRAMUSCULAR; INTRAVENOUS EVERY 6 HOURS PRN
Status: DISCONTINUED | OUTPATIENT
Start: 2024-09-30 | End: 2024-10-01 | Stop reason: HOSPADM

## 2024-09-30 RX ORDER — IBUPROFEN 800 MG/1
800 TABLET, FILM COATED ORAL EVERY 8 HOURS SCHEDULED
Status: DISCONTINUED | OUTPATIENT
Start: 2024-09-30 | End: 2024-10-01 | Stop reason: HOSPADM

## 2024-09-30 RX ORDER — SODIUM CHLORIDE, SODIUM LACTATE, POTASSIUM CHLORIDE, CALCIUM CHLORIDE 600; 310; 30; 20 MG/100ML; MG/100ML; MG/100ML; MG/100ML
INJECTION, SOLUTION INTRAVENOUS CONTINUOUS
Status: DISCONTINUED | OUTPATIENT
Start: 2024-09-30 | End: 2024-10-01 | Stop reason: HOSPADM

## 2024-09-30 RX ORDER — ONDANSETRON 4 MG/1
4 TABLET, ORALLY DISINTEGRATING ORAL EVERY 6 HOURS PRN
Status: DISCONTINUED | OUTPATIENT
Start: 2024-09-30 | End: 2024-10-01 | Stop reason: HOSPADM

## 2024-09-30 RX ORDER — MISOPROSTOL 200 UG/1
400 TABLET ORAL PRN
Status: DISCONTINUED | OUTPATIENT
Start: 2024-09-30 | End: 2024-10-01 | Stop reason: HOSPADM

## 2024-09-30 RX ORDER — TRANEXAMIC ACID 10 MG/ML
1000 INJECTION, SOLUTION INTRAVENOUS
Status: ACTIVE | OUTPATIENT
Start: 2024-09-30 | End: 2024-10-01

## 2024-09-30 RX ORDER — LANOLIN/MINERAL OIL
LOTION (ML) TOPICAL PRN
Status: DISCONTINUED | OUTPATIENT
Start: 2024-09-30 | End: 2024-10-01 | Stop reason: HOSPADM

## 2024-09-30 RX ORDER — LIDOCAINE HYDROCHLORIDE 10 MG/ML
INJECTION, SOLUTION INFILTRATION; PERINEURAL
Status: DISPENSED
Start: 2024-09-30 | End: 2024-09-30

## 2024-09-30 RX ORDER — ONDANSETRON 2 MG/ML
4 INJECTION INTRAMUSCULAR; INTRAVENOUS EVERY 6 HOURS PRN
Status: DISCONTINUED | OUTPATIENT
Start: 2024-09-30 | End: 2024-09-30

## 2024-09-30 RX ORDER — ONDANSETRON 4 MG/1
4 TABLET, ORALLY DISINTEGRATING ORAL EVERY 6 HOURS PRN
Status: DISCONTINUED | OUTPATIENT
Start: 2024-09-30 | End: 2024-09-30

## 2024-09-30 RX ORDER — TERBUTALINE SULFATE 1 MG/ML
0.25 INJECTION, SOLUTION SUBCUTANEOUS
Status: ACTIVE | OUTPATIENT
Start: 2024-09-30 | End: 2024-10-01

## 2024-09-30 RX ORDER — SODIUM CHLORIDE 9 MG/ML
INJECTION, SOLUTION INTRAVENOUS PRN
Status: DISCONTINUED | OUTPATIENT
Start: 2024-09-30 | End: 2024-10-01 | Stop reason: HOSPADM

## 2024-09-30 RX ORDER — METHYLERGONOVINE MALEATE 0.2 MG/ML
200 INJECTION INTRAVENOUS PRN
Status: DISCONTINUED | OUTPATIENT
Start: 2024-09-30 | End: 2024-10-01 | Stop reason: HOSPADM

## 2024-09-30 RX ADMIN — ACETAMINOPHEN 1000 MG: 500 TABLET ORAL at 18:53

## 2024-09-30 RX ADMIN — DOCUSATE SODIUM 100 MG: 100 CAPSULE, LIQUID FILLED ORAL at 08:12

## 2024-09-30 RX ADMIN — OXYTOCIN 999 MILLI-UNITS/MIN: 10 INJECTION, SOLUTION INTRAMUSCULAR; INTRAVENOUS at 05:54

## 2024-09-30 RX ADMIN — IBUPROFEN 800 MG: 800 TABLET, FILM COATED ORAL at 18:53

## 2024-09-30 RX ADMIN — ACETAMINOPHEN 650 MG: 325 TABLET ORAL at 08:12

## 2024-09-30 RX ADMIN — DOCUSATE SODIUM 100 MG: 100 CAPSULE, LIQUID FILLED ORAL at 20:24

## 2024-09-30 ASSESSMENT — PAIN DESCRIPTION - LOCATION: LOCATION: VAGINA

## 2024-09-30 ASSESSMENT — PAIN SCALES - GENERAL: PAINLEVEL_OUTOF10: 1

## 2024-09-30 NOTE — PROGRESS NOTES
0434: Pt arrived to unit walking with steady gait.     0440: This RN at bedside. Pt report ctx starting yesterday am when she woke. Pt denies LOF, and VB, pt endorses positive fetal movement.     0451: SVE per this RN 7/90/0. Admission started at this time and team notified of pt arrival.     \  0520: Raúl SMITH and resident Obs at bedside. Admitting pt and reviewing POC.    0540: Pt reporting constant pushing pressure. SVE per this RN 9/100/+1.     0545: SVE per Raúl SMITH 10/100/+1, AROM at this time, clear moderate fluid and pt instructed to push at this time.  Patient actively pushing. MD and   RN remains in continuous attendance at the bedside.  Assessment & evaluation of fetal heart rate ongoing via continuous EFM.     0548: MD and RN remained at bedside throughout pushing.  EFM continuously assessed.  Vaginal delivery of viable infant.     0710: Bedside and Verbal shift change report given to Shelley RN (oncoming nurse) by Erwin RN  (offgoing nurse). Report included the following information Nurse Handoff Report, Intake/Output, MAR, Recent Results, Quality Measures, and Event Log.  Care handed over at this time

## 2024-09-30 NOTE — PROGRESS NOTES
0670 This RN received report and assumed care of pt. Pt reports she is comfortable at this time with minimal discomfort at her vaginal area.

## 2024-09-30 NOTE — H&P
Mother        No Known Allergies  Prior to Admission medications    Medication Sig Start Date End Date Taking? Authorizing Provider   Prenatal Vit-Fe Fumarate-FA (PRENATAL VITAMINS) 28-0.8 MG TABS Take 1 tablet by mouth daily 24   Karthik Gomez, DO   ferrous sulfate (IRON 325) 325 (65 Fe) MG tablet Take 1 tablet by mouth every other day 24   Karthik Gomez, DO        Review of Systems: as above in HPI.    Objective:     Vitals:  Vitals:    24 0456 24   BP: (!) 137/91    Pulse: 93    Resp: 20    TempSrc: Oral    SpO2: 99% 99%        Physical Exam:  General : no acute distress  CVS: normal rate and rhythm, no murmurs  Lungs: clear to auscultation bilaterally, no wheeze  Cervical Exam:  7cm per RN  Membranes:   Fetal Heart Rate: Reactive  Baseline: 130s per minute  Variability: moderate  Accelerations: yes  Decelerations: none  Uterine contractions: regular, every 2-3 minutes    Prenatal Labs:   PNL: O+. Ab neg. GC/CT neg. HIV/RPR nr. HepBsAg neg. Hep B NI., Rubella/VZV immune. GBS neg. Passed 1h GTT. Pap NILM with HPV neg. Hgb frac wnl. NIPT low risk. Gender female. Ultrasound at 34w6d with EFW 31% and AC 14%.       Assessment/Plan:     Ms. Antonio Neil is a  seen with pregnancy at 38w3d for active labor.     Active Labor: at 38w3d  - SVE 7cm per RN; plan to recheck in 2 hours or sooner as needed  - membranes intact  - [place] [facility]/NST monitoring - reactive thus far.  - Patient desires epidural  - IVF ordered  - CLD    Kamaljit Christineli Opitz Carrier: mother is carrier but FOB never tested. Mother declined invasive testing. Followed by MFM with weekly BPPs, which have all been 8/8. HC <1 %ile.  - patient has already met with genetics, can consider additional follow-up with them prn      Patient seen with Dr. Olsen (attending OBGYN). Dr. Olsen personally supervised history, physical, assessment, management plan, and delivery for this patient.    Ellie Canas MD  Family Medicine Resident

## 2024-09-30 NOTE — PROGRESS NOTES
0434: Pt arrives to unit for tirage and ambulates independently with steady gait to room.    0451: This RN at bedside with Pt pt reporting pushy pressure. This RN educating pt and pt agrees to SVE, SVE 7/90/0 by this RN. Rn notifying team at this time and beinning admission process.      0520: Raúl SMITH and Resident MD at bedside with this RN. EFM/ TOCO reviewed.

## 2024-09-30 NOTE — L&D DELIVERY NOTE
Patient progressed well to C/C/+2 and pushed for approximately 3 min w/  of a liveborn female infant, Apgar scores were 9/9. Head delivered slightly ILDA. Anterior shoulder delivered w/ single maternal effort w/ posterior shoulder and body following easily. Infant placed on maternal abdomen. Delayed cord clamping x 1 min. Cord clamped x 2 and cut by this provider. Pitocin infusion initiated. Placenta delivered spontaneously intact w/ 3 v cord. Perineum inspected and intact. 1st degree vaginal laceration repaired. Fundus firm at U. Mother and baby bonding in LDR. Delivery QBL 150cc.    Abhinav Pelletier [[id number]]      Labor Events     Labor: No   Steroids: None  Cervical Ripening Date/Time:      Antibiotics Received during Labor: No  Rupture Identifier: Sac 1  Rupture Date/Time:  24 05:45:00   Rupture Type: AROM  Fluid Color: Clear  Fluid Odor: None  Fluid Volume: Moderate  Induction: None  Labor Complications: None       Anesthesia    Method: None       Labor Event Times      Labor onset date/time:        Dilation complete date/time:  24 05:45:00     Start pushing date/time:  2024 05:45:00   Decision date/time (emergent ):            Labor Length    2nd stage: 0h 03m  3rd stage: 0h 04m       Delivery Details      Delivery Date: 24 Delivery Time: 05:48:00   Delivery Type: Vaginal, Spontaneous              [place]ia Presentation    Presentation: Vertex  _: Occiput       Shoulder Dystocia    Shoulder Dystocia Present?: No       Assisted Delivery Details    Forceps Attempted?: No  Vacuum Extractor Attempted?: No                           Cord    Vessels: 3 Vessels  Complications: None  Delayed Cord Clamping?: Yes  Cord Clamped Date/Time: 2024 05:50:00  Cord Blood Disposition: Lab  Gases Sent?: No              Placenta    Date/Time: 2024 05:52:00  Removal: Expressed  Appearance: Intact  Disposition: Discarded       Lacerations    Episiotomy: None

## 2024-10-01 VITALS
OXYGEN SATURATION: 99 % | RESPIRATION RATE: 16 BRPM | SYSTOLIC BLOOD PRESSURE: 109 MMHG | HEART RATE: 54 BPM | TEMPERATURE: 97.9 F | DIASTOLIC BLOOD PRESSURE: 72 MMHG

## 2024-10-01 PROBLEM — Z3A.38 38 WEEKS GESTATION OF PREGNANCY: Status: RESOLVED | Noted: 2024-09-30 | Resolved: 2024-10-01

## 2024-10-01 PROCEDURE — 6370000000 HC RX 637 (ALT 250 FOR IP)

## 2024-10-01 RX ORDER — IBUPROFEN 800 MG/1
800 TABLET, FILM COATED ORAL 3 TIMES DAILY PRN
Qty: 30 TABLET | Refills: 0 | Status: SHIPPED | OUTPATIENT
Start: 2024-10-01

## 2024-10-01 RX ORDER — PSEUDOEPHEDRINE HCL 30 MG
100 TABLET ORAL DAILY
Qty: 20 CAPSULE | Refills: 0 | Status: SHIPPED | OUTPATIENT
Start: 2024-10-01

## 2024-10-01 RX ADMIN — ACETAMINOPHEN 1000 MG: 500 TABLET ORAL at 05:11

## 2024-10-01 RX ADMIN — IBUPROFEN 800 MG: 800 TABLET, FILM COATED ORAL at 05:11

## 2024-10-01 ASSESSMENT — PAIN - FUNCTIONAL ASSESSMENT: PAIN_FUNCTIONAL_ASSESSMENT: ACTIVITIES ARE NOT PREVENTED

## 2024-10-01 ASSESSMENT — PAIN DESCRIPTION - DESCRIPTORS: DESCRIPTORS: CRAMPING;SORE

## 2024-10-01 ASSESSMENT — PAIN SCALES - GENERAL: PAINLEVEL_OUTOF10: 3

## 2024-10-01 ASSESSMENT — PAIN DESCRIPTION - ORIENTATION: ORIENTATION: LOWER

## 2024-10-01 ASSESSMENT — PAIN DESCRIPTION - LOCATION: LOCATION: ABDOMEN;PERINEUM

## 2024-10-01 NOTE — DISCHARGE INSTRUCTIONS
Patient Discharge Instructions    Elvira Neil / [id number] : 1995    Admitted 2024 Discharged: 10/1/2024       Por favor tenga yanna documento presente en vasquez rubén de seguimiento con vasquez médico primario.    Primary care provider:   [facility]    Discharging provider:  Brenna Quintanilla MD  - Family Medicine Resident  Dr Marietta Clement  - Family Medicine Attending          Diágnostico de Admisión:  38 weeks gestation of pregnancy [Z3A.38]    RECOMENDACIONES DE CUIDADO:     Future Appointments   Date Time Provider Department Center   2024 10:40 AM Rose Mary Stephens MD [facility]      No follow-up provider specified.    Continue Tratamiento:  - Por favor continue Motrin 800 mg, 1 tableta cada 8 horas por los próximos 2 días, luego 1 tableta cada 8 horas mientras sea necesario para el dolor.  - Por favor continue Colace 100 mg para el estreñimiento, tome 1 tableta dos veces al día hasta que pueda defercar regularmente sin necesidad del medicamento.  - Por favor comience Ferrous Sulfate 325 mg para la anemia, [place] 1 tableta 1 veces día con jugo de naranja.   - Por favor continue tomando saleem vitaminas prenatales.     Importante que monitoreé los siguientes síntomas: dolor de pecho, falta de aire, fiebre, escalofríos, náusea, vómito, diarrea, cambios en vasquez estado mental, caídas, debilidad y sangrado.      DIETA/que comer:  Regular    ACTIVIDAD FÍSICA:   Instrucciones de Actividad Física    No levante nada que sea más pesado que vasquez bebé por las próximas 6 semanas.  No insertar nada por la vaginal por 6 semanas. Luego del parto por cesárea/ vaginal debe evitar tener relaciones sexuales por 6 semanas.Tendrá vasquez rubén de seguimiento posparto a las 6 semanas. Puede manejar vasquez vehículo mientras no esté tomando Percocet ni ningún medicamento nárcotico (Motrin está alok).       Cuidado de Herida:  llene el hoa bottle con agua tibia y exprima hasta que salga un chorro de agua por la boquilla para ayudarle a

## 2024-10-01 NOTE — DISCHARGE SUMMARY
[address] [place] [address]   Office [phone], Fax [phone]      Antepartum Discharge Summary     Name: Elvira Neil MRN: [id number]  SSN: [id number]    YOB: 1995  Age: 28 y.o.  Sex: female      Admit Date: 2024    Discharge Date: 10/1/2024     Admitting Physician: Marietta Clement MD    Attending Physician:  Marietta Clement MD    Attending Physician at discharge: Marietta Clement MD    Admission Diagnoses: 38 weeks gestation of pregnancy [Z3A.38]    Discharge Diagnoses:   (spontaneous vaginal delivery)     Immunization(s):   Immunization History   Administered Date(s) Administered    Hep B, ENGERIX-B, (age 20y+), IM, 1mL 04/15/2024, 2024    Influenza, FLUCELVAX, (age 6 mo+) IM, Trivalent PF, 0.5mL 2024    TDaP, ADACEL (age 10y-64y), BOOSTRIX (age 10y+), IM, 0.5mL 2024        Hospital Course:    Patient is a 27 yo  s/p  at 38 weeks 3 days.  Presented for  Active Labor.  Pregnancy complicated by anemia (on iron), Mari Lemli Opitz carrier (see  prenatal note for more information). Labor was uncomplicated.  Delivered 2.995 kg (6 lb 9.6 oz) baby girl by  without complications.  Normal hospital course following the delivery.  On day of discharge patient reported minimal lochia, well controlled pain, and no other complaints.  Discharged with pain regimen and bowel regimen.  Advised to continue prenatal vitamins, iron supplements, and pain control with ibuprofen as prescribed.  Follow up with Dr. Stephens in 6 weeks.     Condition at Discharge: Stable    Vitals:  Patient Vitals for the past 24 hrs:   BP Temp Temp src Pulse Resp SpO2   10/01/24 0534 109/72 97.9 °F (36.6 °C) Oral 54 16 99 %   24 2202 116/72 98.2 °F (36.8 °C) Oral 76 16 99 %     Exam:  Gen: Patient without distress.               Lung: CTAB, no w/r/r/c               CV: RRR, + S1 and S2, no m/r/g               Abd: Abdomen soft, fundus firm at level of

## 2024-10-01 NOTE — LACTATION NOTE
This note was copied from a baby's chart.  Experienced breastfeeding mother. She breast fed 2 other babies for up to one year. She plans to breastfeed/formula feed her baby. Mother has been primarily formula feeding her baby. LC encouraged mother to offer baby her breast milk first.     Discussed with mother her plan for feeding.  Reviewed the benefits of exclusive breast milk feeding during the hospital stay.   Informed her of the risks of using formula to supplement in the first few days of life as well as the benefits of successful breast milk feeding; referred her to the Breastfeeding booklet about this information.   She acknowledges understanding of information reviewed and states that it is her plan to breast/formula feed her infant.  Will support her choice and offer additional information as needed.       Encouraged mom to attempt feeding with baby led feeding cues. Just as sucking on fingers, rooting, mouthing.   Looking for 8-12 feedings in 24 hours.   Don't limit baby at breast, allow baby to come of breast on it's own. Baby may want to feed  often and may increase number of feedings on second day of life. Skin to skin encouraged.      If baby doesn't nurse,  Mom should  hand express  10-20 drops of colostrum and drip into baby's mouth, or give to baby by finger feeding, cup feeding, or spoon feeding at least every 2-3 hours.     Reviewed breastfeeding basics:  Supply and demand,  stomach size, early  Feeding cues, skin to skin, positioning and baby led latch-on, assymetrical latch with signs of good, deep latch vs shallow, feeding frequency and duration, and log sheet for tracking infant feedings and output.  Breastfeeding Booklet and Warm line information given.  Discussed typical  weight loss and the importance of infant weight checks with pediatrician 1-2 post discharge.       Discussed eating a healthy diet. Instructed mother to eat a variety of foods in order to get a well balanced

## 2024-10-01 NOTE — CARE COORDINATION
10/1/2024  12:20 PM    CM met with JUDE to complete initial assessment and begin discharge planning.  MOB verified and confirmed demographics.  JUDE lives with family,  at the address on file. JUDE reports she has good family support, and feels like she has the support she needs when she returns home.  JUDE plans to breast feed baby and has pump to use at home.  [facility] will provide follow up care for infant. JUDE has car seat, bassinet/crib, clothing, bottles and all necessary supplies for baby. JUDE has [employer] Medicaid, and will be adding baby to this policy. CM discussed process to add baby to insurance, JUDE verbalized understanding. JUDE is also enrolled in [facility].        10/01/24 1220   Service Assessment   Patient Orientation Alert and Oriented   Cognition Alert   History Provided By Patient   Primary Caregiver Self   Support Systems Spouse/Significant Other   PCP Verified by CM Yes   Last Visit to PCP Within last 3 months   Prior Functional Level Independent in ADLs/IADLs   Current Functional Level Independent in ADLs/IADLs   Can patient return to prior living arrangement Yes   Ability to make needs known: Good   Family able to assist with home care needs: Yes   Would you like for me to discuss the discharge plan with any other family members/significant others, and if so, who? No   Financial Resources Medicaid;[facility]     Alejandro Bellamy CM

## 2024-10-02 ENCOUNTER — CLINICAL DOCUMENTATION (OUTPATIENT)
Age: 29
End: 2024-10-02

## 2024-10-02 NOTE — PROGRESS NOTES
Patient came to the office today along with her 2 day old child.  [place] Depression Scale performed and entered into their charts.  
38w5d

## 2024-11-06 NOTE — PROGRESS NOTES
[address] [id number]   Office [phone], Fax [phone]    Postpartum Note    29 y.o. female status post  at 38 weeks 3 days. presenting for postpartum visit.  Patient doing well post-partum without significant complaint.    Pregnancy complicated by anemia (on iron), Smith Lemli Opitz carrier (see  prenatal note for more information). Labor was uncomplicated.  Delivered 2.995 kg (6 lb 9.6 oz) baby girl by  without complications.    PP hgb 11.9.       Lochia: normal  Pain: controlled  Baby: doing well, has seen PCP  Sexual activity: not yet   Plan for contraception: depot injection   Symptoms of depression: none  Breast/bottle: breastfeeding  Support from FOB/family: yes     Vitals:  LMP 2024 (Exact Date)     Exam:  Patient without distress.    Abdomen soft, fundus firm at level of umbilicus, nontender.               Lower extremities:  No edema. No palpable cords or tenderness.      Assessment and Plan:    Elvira Neil is a 29 y.o.   s/p  at 38 weeks 3 days.  Patient having uncomplicated post-partum course.      Irregular menses:  Placed order for depo; patient to call for nurse visit once depo is received.     Smith Lemli Opitz carrier: seen by GC () - causes slow growth, microcephaly, moderate-to-severe intellectual disability, heart defects, cleft palate and other birth defects. However mom noting baby is doing well, has seen pediatrician and is not scheduled to return to genetics.     - Continue routine care  - Discharge summary reviewed  - Blood pressure  - [place] score: 2  - Call clinic or make appointment for symptoms of sadness  - Follow up for yearly well woman exam.      Pt D/w Dr Paulino, attending physician.                Rose Mary Stephens MD

## 2024-11-12 ENCOUNTER — OFFICE VISIT (OUTPATIENT)
Age: 29
End: 2024-11-12

## 2024-11-12 VITALS
RESPIRATION RATE: 18 BRPM | OXYGEN SATURATION: 97 % | TEMPERATURE: 98 F | HEIGHT: 65 IN | SYSTOLIC BLOOD PRESSURE: 108 MMHG | DIASTOLIC BLOOD PRESSURE: 70 MMHG | WEIGHT: 142 LBS | BODY MASS INDEX: 23.66 KG/M2 | HEART RATE: 73 BPM

## 2024-11-12 DIAGNOSIS — N92.6 IRREGULAR MENSES: ICD-10-CM

## 2024-11-12 PROCEDURE — 0503F POSTPARTUM CARE VISIT: CPT

## 2024-11-12 RX ORDER — MEDROXYPROGESTERONE ACETATE 150 MG/ML
150 INJECTION, SUSPENSION INTRAMUSCULAR
Qty: 1 ML | Refills: 3 | Status: SHIPPED | OUTPATIENT
Start: 2024-11-12

## 2024-11-12 ASSESSMENT — PATIENT HEALTH QUESTIONNAIRE - PHQ9
SUM OF ALL RESPONSES TO PHQ QUESTIONS 1-9: 0
1. LITTLE INTEREST OR PLEASURE IN DOING THINGS: NOT AT ALL
SUM OF ALL RESPONSES TO PHQ QUESTIONS 1-9: 0
SUM OF ALL RESPONSES TO PHQ9 QUESTIONS 1 & 2: 0
SUM OF ALL RESPONSES TO PHQ QUESTIONS 1-9: 0
SUM OF ALL RESPONSES TO PHQ QUESTIONS 1-9: 0
2. FEELING DOWN, DEPRESSED OR HOPELESS: NOT AT ALL

## 2024-11-12 NOTE — PROGRESS NOTES
Room 20  668[address] session code     Patient is accompanied by self. I have received verbal consent from Elvira Neil to discuss any/all medical information while they are present in the room.    Identified pt with two pt identifiers(name and ). Reviewed record in preparation for visit and have obtained necessary documentation.  Chief Complaint   Patient presents with    Postpartum Care      Wants to start Depo , needs rx     [place] - 2     Health Maintenance Due   Topic    Varicella vaccine (1 of  - 13+ 2-dose series)    COVID-19 Vaccine ( season)    Hepatitis B vaccine (3 of 3 - 19+ 3-dose series)       Vitals:    24 1055   BP: 108/70   Site: Left Upper Arm   Position: Sitting   Cuff Size: Small Adult   Pulse: 73   Resp: 18   Temp: 98 °F (36.7 °C)   TempSrc: Temporal   SpO2: 97%   Weight: 64.4 kg (142 lb)   Height: 1.638 m (5' 4.5\")       Social Determinants Of Health:       SDOH screening not required at visit.  Resources Declined.   See AVS for attached resources, if requested.    Coordination of Care Questionnaire:       \"Have you been to the ER, urgent care clinic since your last visit?  Hospitalized since your last visit?\"    NO    “Have you seen or consulted any other health care providers outside of [facility] since your last visit?”    NO            Click Here for Release of Records Request

## 2024-11-13 NOTE — PROGRESS NOTES
[facility] Attending Attestation: While the patient was in clinic or immediately following the patient leaving the clinic, I reviewed the patient's medical history, the resident's findings on physical examination, and the patient's diagnosis and treatment plan with the resident and agree with the documentation in the note.     Yang Paulino MD